# Patient Record
Sex: FEMALE | Race: WHITE | NOT HISPANIC OR LATINO | Employment: OTHER | URBAN - METROPOLITAN AREA
[De-identification: names, ages, dates, MRNs, and addresses within clinical notes are randomized per-mention and may not be internally consistent; named-entity substitution may affect disease eponyms.]

---

## 2017-05-21 ENCOUNTER — HISTORICAL (OUTPATIENT)
Dept: ADMINISTRATIVE | Facility: HOSPITAL | Age: 67
End: 2017-05-21

## 2017-05-21 LAB
BASOPHILS NFR BLD: 0 K/UL (ref 0–0.2)
BASOPHILS NFR BLD: 0.3 %
EOSINOPHIL NFR BLD: 0.2 K/UL (ref 0–0.7)
EOSINOPHIL NFR BLD: 1.4 %
ERYTHROCYTE [DISTWIDTH] IN BLOOD BY AUTOMATED COUNT: 23.2 % (ref 11.7–14.9)
GRAN #: 10.9 K/UL (ref 1.4–6.5)
GRAN%: 72.9 %
HCT VFR BLD AUTO: 26.5 % (ref 36–48)
HGB BLD-MCNC: 8.3 G/DL (ref 12–15)
IMMATURE GRANS (ABS): 0.1 K/UL (ref 0–1)
IMMATURE GRANULOCYTES: 0.7 %
LYMPH #: 2.1 K/UL (ref 1.2–3.4)
LYMPH%: 14.1 %
MCH RBC QN AUTO: 22.9 PG (ref 25–35)
MCHC RBC AUTO-ENTMCNC: 31.3 G/DL (ref 31–36)
MCV RBC AUTO: 73 FL (ref 79–98)
MONO #: 1.6 K/UL (ref 0.1–0.6)
MONO%: 10.6 %
NUCLEATED RBCS: 0 %
PLATELET # BLD AUTO: 236 K/UL (ref 140–440)
PMV BLD AUTO: 9.7 FL (ref 8.8–12.7)
RBC # BLD AUTO: 3.63 M/UL (ref 3.5–5.5)
WBC # BLD AUTO: 14.9 K/UL (ref 5–10)

## 2017-05-30 ENCOUNTER — HOSPITAL ENCOUNTER (INPATIENT)
Facility: HOSPITAL | Age: 67
LOS: 4 days | Discharge: HOME HEALTH | DRG: 418 | End: 2017-06-03
Attending: EMERGENCY MEDICINE | Admitting: INTERNAL MEDICINE
Payer: MEDICARE

## 2017-05-30 DIAGNOSIS — Z01.811 PRE-OP CHEST EXAM: ICD-10-CM

## 2017-05-30 DIAGNOSIS — J44.9 CHRONIC OBSTRUCTIVE PULMONARY DISEASE, UNSPECIFIED COPD TYPE: ICD-10-CM

## 2017-05-30 DIAGNOSIS — K85.90 ACUTE PANCREATITIS WITHOUT INFECTION OR NECROSIS, UNSPECIFIED PANCREATITIS TYPE: ICD-10-CM

## 2017-05-30 DIAGNOSIS — D75.839 THROMBOCYTOSIS: ICD-10-CM

## 2017-05-30 DIAGNOSIS — I48.91 ATRIAL FIBRILLATION, UNSPECIFIED TYPE: ICD-10-CM

## 2017-05-30 DIAGNOSIS — R93.89 ABNORMAL CT SCAN: ICD-10-CM

## 2017-05-30 DIAGNOSIS — K80.10 CALCULUS OF GALLBLADDER WITH CHRONIC CHOLECYSTITIS WITHOUT OBSTRUCTION: ICD-10-CM

## 2017-05-30 DIAGNOSIS — I50.32 CHRONIC DIASTOLIC CONGESTIVE HEART FAILURE: ICD-10-CM

## 2017-05-30 DIAGNOSIS — R10.10 UPPER ABDOMINAL PAIN: Primary | ICD-10-CM

## 2017-05-30 DIAGNOSIS — R07.9 CHEST PAIN: ICD-10-CM

## 2017-05-30 PROBLEM — G40.909 SEIZURE DISORDER: Status: ACTIVE | Noted: 2017-05-30

## 2017-05-30 LAB
ALBUMIN SERPL BCP-MCNC: 3.1 G/DL
ALP SERPL-CCNC: 98 U/L
ALT SERPL W/O P-5'-P-CCNC: 7 U/L
ANION GAP SERPL CALC-SCNC: 12 MMOL/L
ANISOCYTOSIS BLD QL SMEAR: ABNORMAL
AST SERPL-CCNC: 15 U/L
BASOPHILS # BLD AUTO: 0 K/UL
BASOPHILS NFR BLD: 0.1 %
BILIRUB SERPL-MCNC: 0.5 MG/DL
BUN SERPL-MCNC: 30 MG/DL
CALCIUM SERPL-MCNC: 9.8 MG/DL
CHLORIDE SERPL-SCNC: 106 MMOL/L
CO2 SERPL-SCNC: 23 MMOL/L
CREAT SERPL-MCNC: 1.6 MG/DL
DIFFERENTIAL METHOD: ABNORMAL
EOSINOPHIL # BLD AUTO: 0 K/UL
EOSINOPHIL NFR BLD: 0.1 %
ERYTHROCYTE [DISTWIDTH] IN BLOOD BY AUTOMATED COUNT: 24.4 %
EST. GFR  (AFRICAN AMERICAN): 38 ML/MIN/1.73 M^2
EST. GFR  (NON AFRICAN AMERICAN): 33 ML/MIN/1.73 M^2
GLUCOSE SERPL-MCNC: 125 MG/DL
HCT VFR BLD AUTO: 36.5 %
HGB BLD-MCNC: 11.6 G/DL
HYPOCHROMIA BLD QL SMEAR: ABNORMAL
LIPASE SERPL-CCNC: 32 U/L
LYMPHOCYTES # BLD AUTO: 1.7 K/UL
LYMPHOCYTES NFR BLD: 8.7 %
MCH RBC QN AUTO: 22.8 PG
MCHC RBC AUTO-ENTMCNC: 31.8 %
MCV RBC AUTO: 72 FL
MONOCYTES # BLD AUTO: 0.8 K/UL
MONOCYTES NFR BLD: 3.8 %
NEUTROPHILS # BLD AUTO: 17.4 K/UL
NEUTROPHILS NFR BLD: 87.3 %
OVALOCYTES BLD QL SMEAR: ABNORMAL
PLATELET # BLD AUTO: 617 K/UL
PMV BLD AUTO: 7.4 FL
POTASSIUM SERPL-SCNC: 4.3 MMOL/L
PROT SERPL-MCNC: 8.1 G/DL
RBC # BLD AUTO: 5.08 M/UL
SODIUM SERPL-SCNC: 141 MMOL/L
WBC # BLD AUTO: 20 K/UL

## 2017-05-30 PROCEDURE — 96361 HYDRATE IV INFUSION ADD-ON: CPT

## 2017-05-30 PROCEDURE — 25000003 PHARM REV CODE 250: Performed by: EMERGENCY MEDICINE

## 2017-05-30 PROCEDURE — 25000003 PHARM REV CODE 250: Performed by: PHYSICIAN ASSISTANT

## 2017-05-30 PROCEDURE — 80053 COMPREHEN METABOLIC PANEL: CPT

## 2017-05-30 PROCEDURE — 96376 TX/PRO/DX INJ SAME DRUG ADON: CPT

## 2017-05-30 PROCEDURE — 93005 ELECTROCARDIOGRAM TRACING: CPT

## 2017-05-30 PROCEDURE — 12000002 HC ACUTE/MED SURGE SEMI-PRIVATE ROOM

## 2017-05-30 PROCEDURE — 96375 TX/PRO/DX INJ NEW DRUG ADDON: CPT

## 2017-05-30 PROCEDURE — 96365 THER/PROPH/DIAG IV INF INIT: CPT

## 2017-05-30 PROCEDURE — 63600175 PHARM REV CODE 636 W HCPCS: Performed by: PHYSICIAN ASSISTANT

## 2017-05-30 PROCEDURE — 99285 EMERGENCY DEPT VISIT HI MDM: CPT | Mod: 25

## 2017-05-30 PROCEDURE — 36415 COLL VENOUS BLD VENIPUNCTURE: CPT

## 2017-05-30 PROCEDURE — 85025 COMPLETE CBC W/AUTO DIFF WBC: CPT

## 2017-05-30 PROCEDURE — 63600175 PHARM REV CODE 636 W HCPCS: Performed by: EMERGENCY MEDICINE

## 2017-05-30 PROCEDURE — 83690 ASSAY OF LIPASE: CPT

## 2017-05-30 RX ORDER — AMOXICILLIN 250 MG
1 CAPSULE ORAL 2 TIMES DAILY PRN
Status: DISCONTINUED | OUTPATIENT
Start: 2017-05-30 | End: 2017-06-03 | Stop reason: HOSPADM

## 2017-05-30 RX ORDER — LACOSAMIDE 10 MG/ML
150 SOLUTION ORAL 2 TIMES DAILY
Status: DISCONTINUED | OUTPATIENT
Start: 2017-05-30 | End: 2017-06-03 | Stop reason: HOSPADM

## 2017-05-30 RX ORDER — IPRATROPIUM BROMIDE AND ALBUTEROL SULFATE 2.5; .5 MG/3ML; MG/3ML
3 SOLUTION RESPIRATORY (INHALATION) EVERY 4 HOURS PRN
Status: DISCONTINUED | OUTPATIENT
Start: 2017-05-30 | End: 2017-06-03 | Stop reason: HOSPADM

## 2017-05-30 RX ORDER — ONDANSETRON 2 MG/ML
4 INJECTION INTRAMUSCULAR; INTRAVENOUS EVERY 6 HOURS PRN
Status: DISCONTINUED | OUTPATIENT
Start: 2017-05-30 | End: 2017-06-03 | Stop reason: HOSPADM

## 2017-05-30 RX ORDER — FUROSEMIDE 40 MG/1
40 TABLET ORAL 2 TIMES DAILY
COMMUNITY

## 2017-05-30 RX ORDER — PROCHLORPERAZINE EDISYLATE 5 MG/ML
5 INJECTION INTRAMUSCULAR; INTRAVENOUS EVERY 6 HOURS PRN
Status: DISCONTINUED | OUTPATIENT
Start: 2017-05-30 | End: 2017-06-03 | Stop reason: HOSPADM

## 2017-05-30 RX ORDER — ONDANSETRON 2 MG/ML
4 INJECTION INTRAMUSCULAR; INTRAVENOUS
Status: COMPLETED | OUTPATIENT
Start: 2017-05-30 | End: 2017-05-30

## 2017-05-30 RX ORDER — MORPHINE SULFATE 2 MG/ML
8 INJECTION, SOLUTION INTRAMUSCULAR; INTRAVENOUS
Status: COMPLETED | OUTPATIENT
Start: 2017-05-30 | End: 2017-05-30

## 2017-05-30 RX ORDER — MORPHINE SULFATE 2 MG/ML
6 INJECTION, SOLUTION INTRAMUSCULAR; INTRAVENOUS
Status: COMPLETED | OUTPATIENT
Start: 2017-05-30 | End: 2017-05-30

## 2017-05-30 RX ORDER — POTASSIUM CHLORIDE 20 MEQ/15ML
60 SOLUTION ORAL
Status: DISCONTINUED | OUTPATIENT
Start: 2017-05-30 | End: 2017-06-03 | Stop reason: HOSPADM

## 2017-05-30 RX ORDER — POTASSIUM CHLORIDE 20 MEQ/15ML
40 SOLUTION ORAL
Status: DISCONTINUED | OUTPATIENT
Start: 2017-05-30 | End: 2017-06-03 | Stop reason: HOSPADM

## 2017-05-30 RX ORDER — DIGOXIN 125 MCG
250 TABLET ORAL DAILY
Status: DISCONTINUED | OUTPATIENT
Start: 2017-05-31 | End: 2017-06-03 | Stop reason: HOSPADM

## 2017-05-30 RX ORDER — OXYCODONE HYDROCHLORIDE 5 MG/1
5 TABLET ORAL EVERY 4 HOURS PRN
Status: DISCONTINUED | OUTPATIENT
Start: 2017-05-30 | End: 2017-06-03 | Stop reason: HOSPADM

## 2017-05-30 RX ORDER — SUCRALFATE 1 G/1
1 TABLET ORAL 4 TIMES DAILY
COMMUNITY

## 2017-05-30 RX ORDER — SUCRALFATE 1 G/10ML
1 SUSPENSION ORAL
Status: DISCONTINUED | OUTPATIENT
Start: 2017-05-31 | End: 2017-06-03 | Stop reason: HOSPADM

## 2017-05-30 RX ORDER — POTASSIUM CHLORIDE 750 MG/1
10 TABLET, EXTENDED RELEASE ORAL DAILY
Status: DISCONTINUED | OUTPATIENT
Start: 2017-05-31 | End: 2017-05-30

## 2017-05-30 RX ORDER — LOSARTAN POTASSIUM 25 MG/1
50 TABLET ORAL 2 TIMES DAILY
Status: DISCONTINUED | OUTPATIENT
Start: 2017-05-30 | End: 2017-05-30

## 2017-05-30 RX ORDER — FLUOXETINE HYDROCHLORIDE 20 MG/1
20 CAPSULE ORAL DAILY
COMMUNITY

## 2017-05-30 RX ORDER — BACLOFEN 10 MG/1
10 TABLET ORAL 2 TIMES DAILY
Status: DISCONTINUED | OUTPATIENT
Start: 2017-05-30 | End: 2017-06-03 | Stop reason: HOSPADM

## 2017-05-30 RX ORDER — SODIUM CHLORIDE, SODIUM LACTATE, POTASSIUM CHLORIDE, CALCIUM CHLORIDE 600; 310; 30; 20 MG/100ML; MG/100ML; MG/100ML; MG/100ML
INJECTION, SOLUTION INTRAVENOUS CONTINUOUS
Status: DISCONTINUED | OUTPATIENT
Start: 2017-05-30 | End: 2017-06-03

## 2017-05-30 RX ORDER — MORPHINE SULFATE 4 MG/ML
4 INJECTION, SOLUTION INTRAMUSCULAR; INTRAVENOUS EVERY 4 HOURS PRN
Status: DISCONTINUED | OUTPATIENT
Start: 2017-05-30 | End: 2017-06-03

## 2017-05-30 RX ORDER — DEXTROSE MONOHYDRATE AND SODIUM CHLORIDE 5; .45 G/100ML; G/100ML
INJECTION, SOLUTION INTRAVENOUS CONTINUOUS
Status: DISCONTINUED | OUTPATIENT
Start: 2017-05-30 | End: 2017-05-30

## 2017-05-30 RX ORDER — LANOLIN ALCOHOL/MO/W.PET/CERES
800 CREAM (GRAM) TOPICAL
Status: DISCONTINUED | OUTPATIENT
Start: 2017-05-30 | End: 2017-06-03 | Stop reason: HOSPADM

## 2017-05-30 RX ORDER — HEPARIN SODIUM 5000 [USP'U]/ML
5000 INJECTION, SOLUTION INTRAVENOUS; SUBCUTANEOUS EVERY 12 HOURS
Status: DISCONTINUED | OUTPATIENT
Start: 2017-05-31 | End: 2017-06-03 | Stop reason: HOSPADM

## 2017-05-30 RX ORDER — TORSEMIDE 20 MG/1
20 TABLET ORAL DAILY
Status: DISCONTINUED | OUTPATIENT
Start: 2017-05-31 | End: 2017-05-30

## 2017-05-30 RX ORDER — ATORVASTATIN CALCIUM 40 MG/1
40 TABLET, FILM COATED ORAL NIGHTLY
Status: DISCONTINUED | OUTPATIENT
Start: 2017-05-30 | End: 2017-06-03 | Stop reason: HOSPADM

## 2017-05-30 RX ORDER — MORPHINE SULFATE 2 MG/ML
6 INJECTION, SOLUTION INTRAMUSCULAR; INTRAVENOUS EVERY 6 HOURS PRN
Status: DISCONTINUED | OUTPATIENT
Start: 2017-05-30 | End: 2017-05-30

## 2017-05-30 RX ORDER — ATORVASTATIN CALCIUM 40 MG/1
40 TABLET, FILM COATED ORAL DAILY
Status: DISCONTINUED | OUTPATIENT
Start: 2017-05-31 | End: 2017-05-30

## 2017-05-30 RX ORDER — METOPROLOL SUCCINATE 50 MG/1
200 TABLET, EXTENDED RELEASE ORAL DAILY
Status: DISCONTINUED | OUTPATIENT
Start: 2017-05-31 | End: 2017-06-03 | Stop reason: HOSPADM

## 2017-05-30 RX ORDER — ONDANSETRON 2 MG/ML
4 INJECTION INTRAMUSCULAR; INTRAVENOUS EVERY 8 HOURS PRN
Status: DISCONTINUED | OUTPATIENT
Start: 2017-05-30 | End: 2017-05-30

## 2017-05-30 RX ORDER — FLUOXETINE HYDROCHLORIDE 20 MG/1
20 CAPSULE ORAL DAILY
Status: DISCONTINUED | OUTPATIENT
Start: 2017-05-31 | End: 2017-06-03 | Stop reason: HOSPADM

## 2017-05-30 RX ORDER — ACETAMINOPHEN 500 MG
1000 TABLET ORAL EVERY 6 HOURS PRN
Status: DISCONTINUED | OUTPATIENT
Start: 2017-05-30 | End: 2017-06-03 | Stop reason: HOSPADM

## 2017-05-30 RX ORDER — ALPRAZOLAM 0.25 MG/1
0.25 TABLET ORAL 2 TIMES DAILY PRN
Status: DISCONTINUED | OUTPATIENT
Start: 2017-05-30 | End: 2017-05-30

## 2017-05-30 RX ORDER — PANTOPRAZOLE SODIUM 40 MG/1
40 TABLET, DELAYED RELEASE ORAL DAILY
Status: DISCONTINUED | OUTPATIENT
Start: 2017-05-31 | End: 2017-06-03 | Stop reason: HOSPADM

## 2017-05-30 RX ADMIN — DEXTROSE AND SODIUM CHLORIDE: 5; .45 INJECTION, SOLUTION INTRAVENOUS at 07:05

## 2017-05-30 RX ADMIN — SODIUM CHLORIDE, SODIUM LACTATE, POTASSIUM CHLORIDE, AND CALCIUM CHLORIDE: .6; .31; .03; .02 INJECTION, SOLUTION INTRAVENOUS at 10:05

## 2017-05-30 RX ADMIN — MORPHINE SULFATE 8 MG: 2 INJECTION, SOLUTION INTRAMUSCULAR; INTRAVENOUS at 02:05

## 2017-05-30 RX ADMIN — MORPHINE SULFATE 4 MG: 4 INJECTION INTRAVENOUS at 11:05

## 2017-05-30 RX ADMIN — PIPERACILLIN SODIUM AND TAZOBACTAM SODIUM 3.38 G: 3; .375 INJECTION, POWDER, FOR SOLUTION INTRAVENOUS at 05:05

## 2017-05-30 RX ADMIN — ATORVASTATIN CALCIUM 40 MG: 40 TABLET, FILM COATED ORAL at 11:05

## 2017-05-30 RX ADMIN — LACOSAMIDE 150 MG: 10 SOLUTION ORAL at 10:05

## 2017-05-30 RX ADMIN — SODIUM CHLORIDE 1000 ML: 0.9 INJECTION, SOLUTION INTRAVENOUS at 03:05

## 2017-05-30 RX ADMIN — LOSARTAN POTASSIUM 50 MG: 25 TABLET, FILM COATED ORAL at 09:05

## 2017-05-30 RX ADMIN — MORPHINE SULFATE 6 MG: 2 INJECTION, SOLUTION INTRAMUSCULAR; INTRAVENOUS at 05:05

## 2017-05-30 RX ADMIN — ONDANSETRON 4 MG: 2 INJECTION INTRAMUSCULAR; INTRAVENOUS at 03:05

## 2017-05-30 RX ADMIN — BACLOFEN 10 MG: 10 TABLET ORAL at 09:05

## 2017-05-30 NOTE — ED PROVIDER NOTES
"Encounter Date: 5/30/2017    SCRIBE #1 NOTE: I, Jacquie George, am scribing for, and in the presence of, Dr De Guzman.       History     Chief Complaint   Patient presents with    Abdominal Pain     Review of patient's allergies indicates:  No Known Allergies  05/30/2017  1:58 PM     Chief Complaint: Abdominal pain      Nelly Landa is a 66 y.o. female with a pmhx of A-fib; COPD; DM; MI; HTN; Stroke presenting [facility] for recurrent abdominal pain which has been ongoing for approximately two weeks. Pt was recently seen at [facility] where she was hospitalized for 11 days and was told she would need "her gall bladder taken out." She describes diffuse abdominal pain which varies in intensity but is constant. She has not noticed change in intensity of pain with eating and there are no alleviating factors. Pt also reports subjective fever and nausea. Denies emesis. Pt has a past surgical history that includes Leg amputation (Left, 2014); Hysterectomy; and Cardiac pacemaker placement.            Past Medical History:   Diagnosis Date    Atrial fibrillation     Cigarette smoker     COPD (chronic obstructive pulmonary disease)     Diabetes mellitus     Heart attack     Hypertension     Intractable epilepsy with complex partial seizures 11/13/2016    Stroke      Past Surgical History:   Procedure Laterality Date    CARDIAC PACEMAKER PLACEMENT      per patient 2014    HYSTERECTOMY      LEG AMPUTATION Left 2014    AKA     History reviewed. No pertinent family history.  Social History   Substance Use Topics    Smoking status: Current Every Day Smoker     Packs/day: 0.50     Years: 30.00    Smokeless tobacco: Never Used    Alcohol use No     Review of Systems   Constitutional: Positive for fever (subjective).   HENT: Negative for sore throat.    Eyes: Negative for visual disturbance.   Respiratory: Negative for cough.    Cardiovascular: Negative for chest pain.   Gastrointestinal: Positive for abdominal " pain and nausea. Negative for diarrhea and vomiting.   Genitourinary: Negative for difficulty urinating and pelvic pain.   Musculoskeletal: Negative for arthralgias.   Skin: Negative for rash.   Neurological: Negative for weakness.       Physical Exam     Initial Vitals   BP Pulse Resp Temp SpO2   -- -- -- -- --     Physical Exam    Nursing note and vitals reviewed.  Constitutional: She appears well-developed.   HENT:   Head: Normocephalic and atraumatic.   Mouth/Throat: Oropharynx is clear and moist.   Eyes: Conjunctivae are normal.   Neck: Neck supple.   Cardiovascular: Normal heart sounds and intact distal pulses. An irregularly irregular rhythm present. Tachycardia present.  Exam reveals no gallop and no friction rub.    No murmur heard.  Pulmonary/Chest: Breath sounds normal. She has no wheezes. She has no rhonchi. She has no rales.   Abdominal: Soft. She exhibits no distension. There is tenderness in the right lower quadrant, epigastric area and left lower quadrant. There is guarding (mild, voluntary).   No hernia. No flank tenderness.    Musculoskeletal:   Left AKA.    Neurological: She is alert and oriented to person, place, and time.   Skin: No rash noted. No erythema.   Psychiatric: She has a normal mood and affect.         ED Course   Procedures  Labs Reviewed   CBC W/ AUTO DIFFERENTIAL - Abnormal; Notable for the following:        Result Value    WBC 20.00 (*)     Hemoglobin 11.6 (*)     Hematocrit 36.5 (*)     MCV 72 (*)     MCH 22.8 (*)     MCHC 31.8 (*)     RDW 24.4 (*)     Platelets 617 (*)     MPV 7.4 (*)     Gran # 17.4 (*)     Gran% 87.3 (*)     Lymph% 8.7 (*)     Mono% 3.8 (*)     All other components within normal limits   COMPREHENSIVE METABOLIC PANEL - Abnormal; Notable for the following:     Glucose 125 (*)     BUN, Bld 30 (*)     Creatinine 1.6 (*)     Albumin 3.1 (*)     ALT 7 (*)     eGFR if  38 (*)     eGFR if non  33 (*)     All other components within  normal limits   LIPASE              Imaging Results          CT Abdomen Pelvis  Without Contrast (Final result)  Result time 05/30/17 16:31:48   Procedure changed from CT Abdomen Pelvis With Contrast     Final result by Mika Bautista MD (05/30/17 16:31:48)                 Impression:      1.  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  2.  Moderate dilatation of the common bile duct likely related to the pancreaticoduodenal process.  3.  Cholelithiasis.  4.  Left renal lesion probable cyst, however confirmation with renal ultrasound is recommended.      Electronically signed by: Mika Bautista MD  Date:     05/30/17  Time:    16:31              Narrative:    CT Abdomen and Pelvis without contrast    Comparison: 03/15/2016    Technique:  Helically acquired axial images of the abdomen and pelvis were acquired without contrast.   Reformatted coronal and sagittal images provided.    FINDINGS: There is moderate fat stranding about the head of the pancreas, within the pancreaticoduodenal groove, and about the descending limb of the duodenum which exhibits circumferential mural thickening.  No associated fluid collection.    There is a single coarse calcification within the mid body of the pancreas.  The pancreatic duct is nondilated.    The gallbladder is contracted.  It contains high density material which may reflect sludge, small stone measuring 2-3 mm is present at the neck of the gallbladder best seen on the coronal series.  No gallbladder wall thickening or pericholecystic fluid is evident.  There is dilatation of the common duct proximal to the above-described pancreatic or duodenal inflammatory process, to 15 mm diameter compared with 7 mm previously.    Cardiac pacer leads are partially seen.  There has been a prior mitral valve repair.  The liver, spleen, and adrenal glands are unremarkable.    The small bowel is normal in caliber.  The appendix is normal.  A few colonic  diverticula are present.  There has been a hysterectomy.  There is moderate calcification of aorta without aneurysm.    There are multiple bilateral nonobstructing renal stones measuring up to 6 mm.  There is a 2.2 cm lesion arising partially exophytically from the posterior lateral lower pole left kidney, demonstrating density above that to allow for characterization as a simple cyst.    There has been a right total hip arthroplasty.  The bones are osteopenic.                            The patient was informed of the incidental finding(s) as well as the need for PCP or specialist follow-up for reevaluation and possible further investigation or monitoring.             Scribe Attestation:   Scribe #1: I performed the above scribed service and the documentation accurately describes the services I performed. I attest to the accuracy of the note.    Attending Attestation:           Physician Attestation for Scribe:  Physician Attestation Statement for Scribe #1: I, Dr De Guzman, reviewed documentation, as scribed by Jacquie George in my presence, and it is both accurate and complete.         Nelly Landa is a 66 y.o. female presenting with continued upper abdominal pain and emesis.  Patient reports stay at outside hospital with concern for gallbladder pathology although uncertain.  Broad workup today shows inflammatory process adjacent to duodenum and pancreas.  There are gallstones and a contracted gallbladder but no other signs of cholecystitis.  I do not think she requires emergent cholecystectomy.  I did speak with Dr. Rodriguez from the hospitalist service who advises admission.  Patient's lipase is normal and pancreatitis is less likely.  I favor possible duodenal pathology.  No sign of other process such as perforation, gangrenous cholecystitis, obstruction, abscess.  I doubt cardiac process.  I do not think further cardiac biomarker is indicated.  Increased creatinine with renal insufficiency likely secondary  dehydration with IV fluids and antiemetic given the emergency department along with analgesia.          ED Course   Comment By Time   EKG:  Atrial fibrillation, rate of 86.  Normal intervals except mild prolonged QTc at 471 ms.  Other intervals normal.  Normal axis.  Old inferior and lateral ST flattening compared to prior. There are no acute ST or T wave changes suggestive of acute ischemia or infarction. Malvin De Guzman MD 05/30 [id number]     Clinical Impression:   The encounter diagnosis was Upper abdominal pain.          Malvin De Guzman MD  05/30/17 [id number]

## 2017-05-31 PROBLEM — R10.10 UPPER ABDOMINAL PAIN: Status: ACTIVE | Noted: 2017-05-31

## 2017-05-31 PROBLEM — K80.10 CALCULUS OF GALLBLADDER WITH CHRONIC CHOLECYSTITIS WITHOUT OBSTRUCTION: Status: ACTIVE | Noted: 2017-05-31

## 2017-05-31 PROBLEM — R93.89 ABNORMAL CT SCAN: Status: ACTIVE | Noted: 2017-05-31

## 2017-05-31 PROBLEM — J44.9 COPD (CHRONIC OBSTRUCTIVE PULMONARY DISEASE): Status: ACTIVE | Noted: 2017-05-31

## 2017-05-31 LAB
ALBUMIN SERPL BCP-MCNC: 2.5 G/DL
ALP SERPL-CCNC: 206 U/L
ALT SERPL W/O P-5'-P-CCNC: 27 U/L
ANION GAP SERPL CALC-SCNC: 9 MMOL/L
AST SERPL-CCNC: 51 U/L
BASOPHILS # BLD AUTO: 0.1 K/UL
BASOPHILS NFR BLD: 0.4 %
BILIRUB SERPL-MCNC: 0.5 MG/DL
BUN SERPL-MCNC: 21 MG/DL
CALCIUM SERPL-MCNC: 8.8 MG/DL
CHLORIDE SERPL-SCNC: 109 MMOL/L
CO2 SERPL-SCNC: 20 MMOL/L
CREAT SERPL-MCNC: 1.2 MG/DL
DIFFERENTIAL METHOD: ABNORMAL
EOSINOPHIL # BLD AUTO: 0.1 K/UL
EOSINOPHIL NFR BLD: 0.8 %
ERYTHROCYTE [DISTWIDTH] IN BLOOD BY AUTOMATED COUNT: 23.9 %
EST. GFR  (AFRICAN AMERICAN): 54 ML/MIN/1.73 M^2
EST. GFR  (NON AFRICAN AMERICAN): 47 ML/MIN/1.73 M^2
GLUCOSE SERPL-MCNC: 89 MG/DL
HCT VFR BLD AUTO: 31.9 %
HGB BLD-MCNC: 10.1 G/DL
LYMPHOCYTES # BLD AUTO: 2 K/UL
LYMPHOCYTES NFR BLD: 12 %
MAGNESIUM SERPL-MCNC: 1.6 MG/DL
MCH RBC QN AUTO: 22.8 PG
MCHC RBC AUTO-ENTMCNC: 31.8 %
MCV RBC AUTO: 72 FL
MONOCYTES # BLD AUTO: 1.7 K/UL
MONOCYTES NFR BLD: 10.1 %
NEUTROPHILS # BLD AUTO: 12.8 K/UL
NEUTROPHILS NFR BLD: 76.7 %
PHOSPHATE SERPL-MCNC: 4.1 MG/DL
PLATELET # BLD AUTO: 462 K/UL
PMV BLD AUTO: 7.7 FL
POTASSIUM SERPL-SCNC: 4.3 MMOL/L
PROT SERPL-MCNC: 6.5 G/DL
RBC # BLD AUTO: 4.45 M/UL
SODIUM SERPL-SCNC: 138 MMOL/L
WBC # BLD AUTO: 16.7 K/UL

## 2017-05-31 PROCEDURE — 85025 COMPLETE CBC W/AUTO DIFF WBC: CPT

## 2017-05-31 PROCEDURE — 99223 1ST HOSP IP/OBS HIGH 75: CPT | Mod: ,,, | Performed by: SURGERY

## 2017-05-31 PROCEDURE — 63600175 PHARM REV CODE 636 W HCPCS: Performed by: PHYSICIAN ASSISTANT

## 2017-05-31 PROCEDURE — 99900035 HC TECH TIME PER 15 MIN (STAT)

## 2017-05-31 PROCEDURE — 80053 COMPREHEN METABOLIC PANEL: CPT

## 2017-05-31 PROCEDURE — 25000003 PHARM REV CODE 250: Performed by: EMERGENCY MEDICINE

## 2017-05-31 PROCEDURE — 25000003 PHARM REV CODE 250: Performed by: PHYSICIAN ASSISTANT

## 2017-05-31 PROCEDURE — 99223 1ST HOSP IP/OBS HIGH 75: CPT | Mod: ,,, | Performed by: INTERNAL MEDICINE

## 2017-05-31 PROCEDURE — 12000002 HC ACUTE/MED SURGE SEMI-PRIVATE ROOM

## 2017-05-31 PROCEDURE — 99222 1ST HOSP IP/OBS MODERATE 55: CPT | Mod: ,,, | Performed by: INTERNAL MEDICINE

## 2017-05-31 PROCEDURE — 83735 ASSAY OF MAGNESIUM: CPT

## 2017-05-31 PROCEDURE — 84100 ASSAY OF PHOSPHORUS: CPT

## 2017-05-31 PROCEDURE — 36415 COLL VENOUS BLD VENIPUNCTURE: CPT

## 2017-05-31 RX ORDER — IBUPROFEN 200 MG
1 TABLET ORAL DAILY
Status: DISCONTINUED | OUTPATIENT
Start: 2017-05-31 | End: 2017-06-03 | Stop reason: HOSPADM

## 2017-05-31 RX ADMIN — MORPHINE SULFATE 4 MG: 4 INJECTION INTRAVENOUS at 08:05

## 2017-05-31 RX ADMIN — DIGOXIN 250 MCG: 0.12 TABLET ORAL at 09:05

## 2017-05-31 RX ADMIN — MORPHINE SULFATE 4 MG: 4 INJECTION INTRAVENOUS at 03:05

## 2017-05-31 RX ADMIN — LACOSAMIDE 150 MG: 10 SOLUTION ORAL at 09:05

## 2017-05-31 RX ADMIN — NICOTINE 1 PATCH: 21 PATCH, EXTENDED RELEASE TRANSDERMAL at 09:05

## 2017-05-31 RX ADMIN — LACOSAMIDE 150 MG: 10 SOLUTION ORAL at 12:05

## 2017-05-31 RX ADMIN — SUCRALFATE 1 G: 1 SUSPENSION ORAL at 05:05

## 2017-05-31 RX ADMIN — SODIUM CHLORIDE, SODIUM LACTATE, POTASSIUM CHLORIDE, AND CALCIUM CHLORIDE: .6; .31; .03; .02 INJECTION, SOLUTION INTRAVENOUS at 05:05

## 2017-05-31 RX ADMIN — FLUOXETINE 20 MG: 20 CAPSULE ORAL at 09:05

## 2017-05-31 RX ADMIN — MORPHINE SULFATE 4 MG: 4 INJECTION INTRAVENOUS at 12:05

## 2017-05-31 RX ADMIN — BACLOFEN 10 MG: 10 TABLET ORAL at 08:05

## 2017-05-31 RX ADMIN — ATORVASTATIN CALCIUM 40 MG: 40 TABLET, FILM COATED ORAL at 08:05

## 2017-05-31 RX ADMIN — HEPARIN SODIUM 5000 UNITS: 5000 INJECTION, SOLUTION INTRAVENOUS; SUBCUTANEOUS at 09:05

## 2017-05-31 RX ADMIN — MORPHINE SULFATE 4 MG: 4 INJECTION INTRAVENOUS at 04:05

## 2017-05-31 RX ADMIN — PANTOPRAZOLE SODIUM 40 MG: 40 TABLET, DELAYED RELEASE ORAL at 09:05

## 2017-05-31 RX ADMIN — SUCRALFATE 1 G: 1 SUSPENSION ORAL at 04:05

## 2017-05-31 RX ADMIN — HEPARIN SODIUM 5000 UNITS: 5000 INJECTION, SOLUTION INTRAVENOUS; SUBCUTANEOUS at 08:05

## 2017-05-31 RX ADMIN — BACLOFEN 10 MG: 10 TABLET ORAL at 09:05

## 2017-05-31 RX ADMIN — METOPROLOL SUCCINATE 200 MG: 50 TABLET, EXTENDED RELEASE ORAL at 09:05

## 2017-05-31 NOTE — SUBJECTIVE & OBJECTIVE
No current facility-administered medications on file prior to encounter.      Current Outpatient Prescriptions on File Prior to Encounter   Medication Sig    atorvastatin (LIPITOR) 40 MG tablet Take 40 mg by mouth once daily.     baclofen (LIORESAL) 10 MG tablet Take 10 mg by mouth 2 (two) times daily.    lacosamide 150 mg Tab Take 1 tablet (150 mg total) by mouth 2 (two) times daily.    metoprolol succinate (TOPROL-XL) 200 MG 24 hr tablet Take 1 tablet (200 mg total) by mouth once daily.    pantoprazole (PROTONIX) 40 MG tablet Take 40 mg by mouth once daily.    rivaroxaban (XARELTO) 20 mg Tab Take 20 mg by mouth daily with dinner or evening meal.    torsemide (DEMADEX) 20 MG Tab Take 1 tablet (20 mg total) by mouth once daily.       Review of patient's allergies indicates:  No Known Allergies    Past Medical History:   Diagnosis Date    Atrial fibrillation     Cigarette smoker     COPD (chronic obstructive pulmonary disease)     Diabetes mellitus     Heart attack     Hypertension     Intractable epilepsy with complex partial seizures 11/13/2016    Stroke      Past Surgical History:   Procedure Laterality Date    CARDIAC PACEMAKER PLACEMENT      per patient 2014    HYSTERECTOMY      LEG AMPUTATION Left 2014    AKA     Family History     Problem Relation (Age of Onset)    Cancer Mother    No Known Problems Father        Social History Main Topics    Smoking status: Current Every Day Smoker     Packs/day: 1.00     Years: 30.00    Smokeless tobacco: Never Used    Alcohol use No    Drug use: No    Sexual activity: Not Currently     Review of Systems   Constitutional: Negative for appetite change, chills, diaphoresis, fatigue, fever and unexpected weight change.   HENT: Negative for hearing loss, sore throat, trouble swallowing and voice change.    Eyes: Negative for visual disturbance.   Respiratory: Negative for cough, shortness of breath and wheezing.    Cardiovascular: Negative for chest pain,  palpitations and leg swelling.   Gastrointestinal: Positive for abdominal pain and nausea. Negative for abdominal distention, anal bleeding, blood in stool, constipation, diarrhea, rectal pain and vomiting.   Genitourinary: Negative for difficulty urinating, dysuria, flank pain, frequency, hematuria, menstrual problem and urgency.   Musculoskeletal: Negative for arthralgias, back pain, joint swelling, myalgias and neck pain.   Skin: Negative for pallor and rash.   Neurological: Negative for dizziness, syncope, weakness and headaches.   Hematological: Negative for adenopathy. Does not bruise/bleed easily.   Psychiatric/Behavioral: Negative for suicidal ideas. The patient is not nervous/anxious.      Objective:     Vital Signs (Most Recent):  Temp: 98.1 °F (36.7 °C) (05/31/17 0700)  Pulse: 95 (05/31/17 0700)  Resp: 18 (05/31/17 0700)  BP: (!) 178/71 (05/31/17 0700)  SpO2: 95 % (05/31/17 0700) Vital Signs (24h Range):  Temp:  [97.7 °F (36.5 °C)-98.1 °F (36.7 °C)] 98.1 °F (36.7 °C)  Pulse:  [76-95] 95  Resp:  [18] 18  SpO2:  [93 %-100 %] 95 %  BP: (140-178)/(71-80) 178/71     Weight: 70.3 kg (155 lb)  Body mass index is 30.27 kg/m².    Physical Exam   Constitutional: She is oriented to person, place, and time. She appears well-developed and well-nourished. No distress.   HENT:   Head: Normocephalic and atraumatic.   Right Ear: External ear normal.   Left Ear: External ear normal.   Eyes: Conjunctivae are normal. Pupils are equal, round, and reactive to light. Right eye exhibits no discharge. Left eye exhibits no discharge.   Neck: No tracheal deviation present. No thyromegaly present.   Cardiovascular: Normal rate and regular rhythm.    Pulmonary/Chest: Effort normal. No respiratory distress.   Abdominal: Soft. She exhibits no distension. There is no hepatosplenomegaly. There is tenderness in the right lower quadrant and periumbilical area. There is no rebound and no guarding. No hernia.   Musculoskeletal: She exhibits  no edema or tenderness.   Lymphadenopathy:     She has no cervical adenopathy.   Neurological: She is alert and oriented to person, place, and time. No cranial nerve deficit.   Skin: Skin is warm and dry. No rash noted. She is not diaphoretic. No pallor.   Psychiatric: She has a normal mood and affect. Her behavior is normal. Judgment and thought content normal.       Significant Labs:  CBC:   Recent Labs  Lab 05/31/17  0635   WBC 16.70*   RBC 4.45   HGB 10.1*   HCT 31.9*   *   MCV 72*   MCH 22.8*   MCHC 31.8*     CMP:   Recent Labs  Lab 05/31/17  0635   GLU 89   CALCIUM 8.8   ALBUMIN 2.5*   PROT 6.5      K 4.3   CO2 20*      BUN 21   CREATININE 1.2   ALKPHOS 206*   ALT 27   AST 51*   BILITOT 0.5     Coagulation: No results for input(s): INR, APTT in the last 168 hours.    Invalid input(s): PT    Significant Diagnostics:  I have reviewed all pertinent imaging results/findings within the past 24 hours.

## 2017-05-31 NOTE — CONSULTS
[facility]     CC: Epigastric pain    HPI 66 y.o. female with epigastric pain, onset a couple of weeks ago, epigastric location, sharp/burning/aching, nonradiating, associated with emesis which is nonbloody, with no alleviating/exacerbating factors.  She was admitted at [facility] for the same a couple of weeks ago and had extensive workup.  All these records were independently reviewed including endoscopy notes from Dr. Vargas.  She had inflammation of the antrum and proximal duodenum with shallow ulcerations.  She is on PPI.  She had imaging with periduodenal/peripancreatic inflammation noted as well as CBD dilatation.  She has normal LFTs with no elevation of lipase, bilirubin, or alk phos.  She was told that she might need cholecystectomy, but surgery opted to discharge her on antibiotics for the time being.  She then presented to our hospital for persistent symptoms.  No other new complaints at this time.  Case was discussed with Dr. Marquez in person.    Past Medical History:   Diagnosis Date    Atrial fibrillation     Cigarette smoker     COPD (chronic obstructive pulmonary disease)     Diabetes mellitus     Heart attack     Hypertension     Intractable epilepsy with complex partial seizures 11/13/2016    Stroke        Past Surgical History:   Procedure Laterality Date    CARDIAC PACEMAKER PLACEMENT      per patient 2014    HYSTERECTOMY      LEG AMPUTATION Left 2014    AKA       Social History   Substance Use Topics    Smoking status: Current Every Day Smoker     Packs/day: 1.00     Years: 30.00    Smokeless tobacco: Never Used    Alcohol use No       Family History   Problem Relation Age of Onset    Cancer Mother     No Known Problems Father        Review of Systems  General ROS: negative for - chills, fever or weight loss  Psychological ROS: negative for - hallucination, depression or suicidal ideation  Ophthalmic ROS: negative for - blurry vision, photophobia or eye pain  ENT ROS:  negative for - epistaxis, sore throat or rhinorrhea  Respiratory ROS: no cough, shortness of breath, or wheezing  Cardiovascular ROS: no chest pain or dyspnea on exertion  Gastrointestinal ROS: + abdominal pain and emesis  Genito-Urinary ROS: no dysuria, trouble voiding, or hematuria  Musculoskeletal ROS: negative for - arthralgia, myalgia, weakness, + history of left AKA  Neurological ROS: no syncope or seizures; no ataxia  Dermatological ROS: negative for pruritis, rash and jaundice    Physical Examination  BP (!) 170/85 (BP Location: Left arm, Patient Position: Lying, BP Method: Automatic)   Pulse 69   Temp 98.2 °F (36.8 °C) (Oral)   Resp 20   Ht 5' (1.524 m)   Wt 70.3 kg (155 lb)   LMP  (LMP Unknown)   SpO2 96%   Breastfeeding? No   BMI 30.27 kg/m²   General appearance: alert, cooperative, no distress  HENT: Normocephalic, atraumatic, neck symmetrical, no nasal discharge   Eyes: conjunctivae/corneas clear, PERRL, EOM's intact, sclera anicteric  Lungs: clear to auscultation bilaterally, no dullness to percussion bilaterally, symmetric expansion, breathing unlabored  Heart: regular rate and rhythm without rub; no displacement of the PMI   Abdomen: + epigastric tenderness, no rebound, + BS  Extremities: extremities symmetric except for left AKA; no clubbing, cyanosis, or edema  Integument: Skin color, texture, turgor normal; no rashes; hair distrubution normal, no jaundice  Neurologic: Alert and oriented X 3, no focal sensory or motor neurologic deficits  Psychiatric: no pressured speech; normal affect; no evidence of impaired cognition, no anxiety/depression     Labs:  Lab Results   Component Value Date    WBC 16.70 (H) 05/31/2017    HGB 10.1 (L) 05/31/2017    HCT 31.9 (L) 05/31/2017    MCV 72 (L) 05/31/2017     (H) 05/31/2017       CMP  Sodium   Date Value Ref Range Status   05/31/2017 138 136 - 145 mmol/L Final     Potassium   Date Value Ref Range Status   05/31/2017 4.3 3.5 - 5.1 mmol/L Final      Chloride   Date Value Ref Range Status   05/31/2017 109 95 - 110 mmol/L Final     CO2   Date Value Ref Range Status   05/31/2017 20 (L) 23 - 29 mmol/L Final     Glucose   Date Value Ref Range Status   05/31/2017 89 70 - 110 mg/dL Final     BUN, Bld   Date Value Ref Range Status   05/31/2017 21 8 - 23 mg/dL Final     Creatinine   Date Value Ref Range Status   05/31/2017 1.2 0.5 - 1.4 mg/dL Final     Calcium   Date Value Ref Range Status   05/31/2017 8.8 8.7 - 10.5 mg/dL Final     Total Protein   Date Value Ref Range Status   05/31/2017 6.5 6.0 - 8.4 g/dL Final     Albumin   Date Value Ref Range Status   05/31/2017 2.5 (L) 3.5 - 5.2 g/dL Final     Total Bilirubin   Date Value Ref Range Status   05/31/2017 0.5 0.1 - 1.0 mg/dL Final     Comment:     For infants and newborns, interpretation of results should be based  on gestational age, weight and in agreement with clinical  observations.  Premature Infant recommended reference ranges:  Up to 24 hours.............<8.0 mg/dL  Up to 48 hours............<12.0 mg/dL  3-5 days..................<15.0 mg/dL  6-29 days.................<15.0 mg/dL       Alkaline Phosphatase   Date Value Ref Range Status   05/31/2017 206 (H) 55 - 135 U/L Final     AST   Date Value Ref Range Status   05/31/2017 51 (H) 10 - 40 U/L Final     ALT   Date Value Ref Range Status   05/31/2017 27 10 - 44 U/L Final     Anion Gap   Date Value Ref Range Status   05/31/2017 9 8 - 16 mmol/L Final     eGFR if    Date Value Ref Range Status   05/31/2017 54 (A) >60 mL/min/1.73 m^2 Final     eGFR if non    Date Value Ref Range Status   05/31/2017 47 (A) >60 mL/min/1.73 m^2 Final     Comment:     Calculation used to obtain the estimated glomerular filtration  rate (eGFR) is the CKD-EPI equation. Since race is unknown   in our information system, the eGFR values for   -American and Non--American patients are given   for each creatinine result.              Imaging:  Multiple imaging modalities were independently visualized and reviewed by me and showed possible groove pancreatitis/duodenal thickening, and dilated CBD without filling defect.  The patient cannot have MRCP secondary to pacemaker.    I have personally reviewed these images    Assessment:   1.  CBD dilatation  2.  Epigastric pain  3.  Abnormal imaging - likely secondary to acute ulcerative duodenitis which was evaluated by EGD on 5/17 per Dr. Vargas  4.  Multiple medical problems  5.  Likely cholecystitis      Plan:  1.  PPI  2.  Avoid NSAIDs  3.  With normal LFTs and no filling defect noted, ERCP not indicated.  Since patient cannot have MRCP because of pacemaker, EUS to further evaluate the CBD is recommended  4.  Surgery for cholecystectomy  5.  Repeat EGD in 6-8 weeks to check on healing of ulcers  6.  Further recommendations to follow after above.  7.  Communication will be sent to the referring MD, Dr. Rodriguez regarding my assessment and plan on this patient via [employer].  GI to sign off for now.  Call for questions.      Danny Chase MD  [facility]  [address], [address]  [place] [address]  Office: [phone]  Fax: [phone]

## 2017-05-31 NOTE — CONSULTS
"[facility]  General Surgery  Consult Note    Patient Name: Nelly Landa  MRN: [id number]  Code Status: Prior  Admission Date: 5/30/2017  Hospital Length of Stay: 1 days  Attending Physician: Shirlene Rodriguez MD  Primary Care Provider: Kyle Acuna MD    Patient information was obtained from patient and ER records.     Inpatient consult to General Surgery  Consult performed by: DEMETRIS MORGAN  Consult ordered by: FAUSTINO FUNEZ        Subjective:     Principal Problem: Pancreatitis, acute    History of Present Illness: Miss Landa presents for evaluation of abdominal pain which has been present for 1-2 weeks. The pain is worse after she eats. The pain is throughout her abdomen. She denies known alleviating factors other than analgesics she has received here. She reports being evaluated at [facility] for 11 days and being told she would need a cholecystectomy.  When asked why they didn't do it at [facility] she said the surgeon wasn't sure her pain was coming from the gallbladder.  She describes diffuse abdominal pain which varies in intensity but is constant. She has not noticed change in intensity of pain with eating and there are no alleviating factors. Pt also reports subjective fever and nausea. Denies emesis.   She was evaluated here in the ED and underwent CT abdomen which revealed findings suggestive of "groove pancreatitis"  and dilated CBD, possibly due to pancreaticoduodenal process. She denies alcohol consumption, trauma, new medication. She smokes cigarettes. She denies weight loss. She states her mother was diagnosed with either biliary or liver cancer. She reports history of PUD. She denies using NSAID recently. She denies fever.    No current facility-administered medications on file prior to encounter.      Current Outpatient Prescriptions on File Prior to Encounter   Medication Sig    atorvastatin (LIPITOR) 40 MG tablet Take 40 mg by mouth once daily.     baclofen (LIORESAL) 10 MG tablet Take 10 " mg by mouth 2 (two) times daily.    lacosamide 150 mg Tab Take 1 tablet (150 mg total) by mouth 2 (two) times daily.    metoprolol succinate (TOPROL-XL) 200 MG 24 hr tablet Take 1 tablet (200 mg total) by mouth once daily.    pantoprazole (PROTONIX) 40 MG tablet Take 40 mg by mouth once daily.    rivaroxaban (XARELTO) 20 mg Tab Take 20 mg by mouth daily with dinner or evening meal.    torsemide (DEMADEX) 20 MG Tab Take 1 tablet (20 mg total) by mouth once daily.       Review of patient's allergies indicates:  No Known Allergies    Past Medical History:   Diagnosis Date    Atrial fibrillation     Cigarette smoker     COPD (chronic obstructive pulmonary disease)     Diabetes mellitus     Heart attack     Hypertension     Intractable epilepsy with complex partial seizures 11/13/2016    Stroke      Past Surgical History:   Procedure Laterality Date    CARDIAC PACEMAKER PLACEMENT      per patient 2014    HYSTERECTOMY      LEG AMPUTATION Left 2014    AKA     Family History     Problem Relation (Age of Onset)    Cancer Mother    No Known Problems Father        Social History Main Topics    Smoking status: Current Every Day Smoker     Packs/day: 1.00     Years: 30.00    Smokeless tobacco: Never Used    Alcohol use No    Drug use: No    Sexual activity: Not Currently     Review of Systems   Constitutional: Negative for appetite change, chills, diaphoresis, fatigue, fever and unexpected weight change.   HENT: Negative for hearing loss, sore throat, trouble swallowing and voice change.    Eyes: Negative for visual disturbance.   Respiratory: Negative for cough, shortness of breath and wheezing.    Cardiovascular: Negative for chest pain, palpitations and leg swelling.   Gastrointestinal: Positive for abdominal pain and nausea. Negative for abdominal distention, anal bleeding, blood in stool, constipation, diarrhea, rectal pain and vomiting.   Genitourinary: Negative for difficulty urinating, dysuria, flank  pain, frequency, hematuria, menstrual problem and urgency.   Musculoskeletal: Negative for arthralgias, back pain, joint swelling, myalgias and neck pain.   Skin: Negative for pallor and rash.   Neurological: Negative for dizziness, syncope, weakness and headaches.   Hematological: Negative for adenopathy. Does not bruise/bleed easily.   Psychiatric/Behavioral: Negative for suicidal ideas. The patient is not nervous/anxious.      Objective:     Vital Signs (Most Recent):  Temp: 98.1 °F (36.7 °C) (05/31/17 0700)  Pulse: 95 (05/31/17 0700)  Resp: 18 (05/31/17 0700)  BP: (!) 178/71 (05/31/17 0700)  SpO2: 95 % (05/31/17 0700) Vital Signs (24h Range):  Temp:  [97.7 °F (36.5 °C)-98.1 °F (36.7 °C)] 98.1 °F (36.7 °C)  Pulse:  [76-95] 95  Resp:  [18] 18  SpO2:  [93 %-100 %] 95 %  BP: (140-178)/(71-80) 178/71     Weight: 70.3 kg (155 lb)  Body mass index is 30.27 kg/m².    Physical Exam   Constitutional: She is oriented to person, place, and time. She appears well-developed and well-nourished. No distress.   HENT:   Head: Normocephalic and atraumatic.   Right Ear: External ear normal.   Left Ear: External ear normal.   Eyes: Conjunctivae are normal. Pupils are equal, round, and reactive to light. Right eye exhibits no discharge. Left eye exhibits no discharge.   Neck: No tracheal deviation present. No thyromegaly present.   Cardiovascular: Normal rate and regular rhythm.    Pulmonary/Chest: Effort normal. No respiratory distress.   Abdominal: Soft. She exhibits no distension. There is no hepatosplenomegaly. There is tenderness in the right lower quadrant and periumbilical area. There is no rebound and no guarding. No hernia.   Musculoskeletal: She exhibits no edema or tenderness.   Lymphadenopathy:     She has no cervical adenopathy.   Neurological: She is alert and oriented to person, place, and time. No cranial nerve deficit.   Skin: Skin is warm and dry. No rash noted. She is not diaphoretic. No pallor.   Psychiatric: She  has a normal mood and affect. Her behavior is normal. Judgment and thought content normal.       Significant Labs:  CBC:   Recent Labs  Lab 05/31/17  0635   WBC 16.70*   RBC 4.45   HGB 10.1*   HCT 31.9*   *   MCV 72*   MCH 22.8*   MCHC 31.8*     CMP:   Recent Labs  Lab 05/31/17  0635   GLU 89   CALCIUM 8.8   ALBUMIN 2.5*   PROT 6.5      K 4.3   CO2 20*      BUN 21   CREATININE 1.2   ALKPHOS 206*   ALT 27   AST 51*   BILITOT 0.5     Coagulation: No results for input(s): INR, APTT in the last 168 hours.    Invalid input(s): PT    Significant Diagnostics:  I have reviewed all pertinent imaging results/findings within the past 24 hours.    Assessment/Plan:     * Pancreatitis, acute    At this time, lipase is normal.  Awaiting records from [facility] and GI consult.  She states she had an EGD at [facility] that showed bleeding ulcers.  Recommend elective cholecystectomy when cleared by GI.          VTE Risk Mitigation         Ordered     heparin (porcine) injection 5,000 Units  Every 12 hours     Route:  Subcutaneous        05/30/17 2246     Medium Risk of VTE  Once      05/30/17 2246          Thank you for your consult. I will follow-up with patient. Please contact us if you have any additional questions.    Syed Marquez MD  General Surgery  [facility]

## 2017-05-31 NOTE — PLAN OF CARE
SW met w/ pt to complete d/c assessment.  Pt lives w/ dtr, her adult children provide daily support and assistance.  Pt currently receives HH w/ [facility], requests to resume w/ same provider.  Pt has DME to assist w/ L leg amputation.  Pt states her son Mars Ellison has POA, not sure if she has this documented.  Pt uses [facility].     05/31/17 2887   Discharge Assessment   Assessment Type Discharge Planning Assessment   Confirmed/corrected address and phone number on facesheet? Yes   Assessment information obtained from? Patient   Type of Healthcare Directive Received Durable power of  for health care;Living will  (not on file  Pt provided verbal that son Mars Ellison is pt's healthcare POA)   Prior to hospitilization cognitive status: Alert/Oriented   Prior to hospitalization functional status: Needs Assistance;Wheelchair Bound   Current cognitive status: Alert/Oriented   Current Functional Status: Needs Assistance;Wheelchair Bound   Arrived From home health   Lives With child(josue), adult   Able to Return to Prior Arrangements yes   Is patient able to care for self after discharge? Yes   How many people do you have in your home that can help with your care after discharge? 1   Who are your caregiver(s) and their phone number(s)? dtr:  Roselia Ellison [phone]   Patient's perception of discharge disposition home health   Readmission Within The Last 30 Days no previous admission in last 30 days   Patient currently being followed by outpatient case management? No   Patient currently receives home health services? Yes   Patient previously received home health services and would like to resume services if necessary? Yes   If yes, name of home health provider: [facility]   Does the patient currently use HME? Yes   Patient currently receives private duty nursing? No   Patient currently receives any other outside agency services? No   Equipment Currently Used at Home hospital bed;wheelchair;bedside  commode;prosthesis;walker, rolling  (pt has prosthetic for L leg, states she does not use it)   Do you have any problems affording any of your prescribed medications? No   Is the patient taking medications as prescribed? yes   Do you have any financial concerns preventing you from receiving the healthcare you need? No   Does the patient have transportation to healthcare appointments? Yes   Transportation Available family or friend will provide   On Dialysis? No   Does the patient receive services at the [facility]? No   Are there any open cases? No   Discharge Plan A Home Health   Discharge Plan B Home Health   Patient/Family In Agreement With Plan yes

## 2017-05-31 NOTE — ASSESSMENT & PLAN NOTE
At this time, lipase is normal.  Awaiting records from [facility] and GI consult.  She states she had an EGD at [facility] that showed bleeding ulcers.  Recommend elective cholecystectomy when cleared by GI.

## 2017-05-31 NOTE — HPI
"Miss Landa presents for evaluation of abdominal pain which has been present for 1-2 weeks. The pain is worse after she eats. The pain is throughout her abdomen. She denies known alleviating factors other than analgesics she has received here. She reports being evaluated at [facility] for 11 days and being told she would need a cholecystectomy.  When asked why they didn't do it at [facility] she said the surgeon wasn't sure her pain was coming from the gallbladder.  She describes diffuse abdominal pain which varies in intensity but is constant. She has not noticed change in intensity of pain with eating and there are no alleviating factors. Pt also reports subjective fever and nausea. Denies emesis.   She was evaluated here in the ED and underwent CT abdomen which revealed findings suggestive of "groove pancreatitis"  and dilated CBD, possibly due to pancreaticoduodenal process. She denies alcohol consumption, trauma, new medication. She smokes cigarettes. She denies weight loss. She states her mother was diagnosed with either biliary or liver cancer. She reports history of PUD. She denies using NSAID recently. She denies fever.  "

## 2017-05-31 NOTE — ASSESSMENT & PLAN NOTE
NPO  IVF  PRN analgesics  Consult GE for possible EGD to evaluate duodenum  Consult surgeon for possible cholecystectomy

## 2017-05-31 NOTE — PLAN OF CARE
Problem: Patient Care Overview  Goal: Plan of Care Review  Outcome: Ongoing (interventions implemented as appropriate)  Pt remains free from injury or falls. Vital signs stable throughout night on room air. Incontinence care provided, Telemetry maintained. Pt NPO. Positions self independently in bed,  Left AKA.  Pain managed with IV medications. Bed in low locked position and call light within reach.  Will continue to monitor.

## 2017-05-31 NOTE — ASSESSMENT & PLAN NOTE
Hold diuretic   Obtain urine for routine UA  Avoid non-essential nephrotoxins, dose medications according to GFR  Monitor I/O, daily weight. Monitor for volume overload  Check urine sodium, creatinine - calculate FENa  Check urine uric acid - calculate FEUrea  Consider checking urine eosinophils  Consider checking CPK  Consider renal bladder US to rule out obstructive nephropathy

## 2017-05-31 NOTE — SUBJECTIVE & OBJECTIVE
Past Medical History:   Diagnosis Date    Atrial fibrillation     Cigarette smoker     COPD (chronic obstructive pulmonary disease)     Diabetes mellitus     Heart attack     Hypertension     Intractable epilepsy with complex partial seizures 11/13/2016    Stroke        Past Surgical History:   Procedure Laterality Date    CARDIAC PACEMAKER PLACEMENT      per patient 2014    HYSTERECTOMY      LEG AMPUTATION Left 2014    AKA       Review of patient's allergies indicates:  No Known Allergies    No current facility-administered medications on file prior to encounter.      Current Outpatient Prescriptions on File Prior to Encounter   Medication Sig    atorvastatin (LIPITOR) 40 MG tablet Take 40 mg by mouth once daily.     baclofen (LIORESAL) 10 MG tablet Take 10 mg by mouth 2 (two) times daily.    lacosamide 150 mg Tab Take 1 tablet (150 mg total) by mouth 2 (two) times daily.    metoprolol succinate (TOPROL-XL) 200 MG 24 hr tablet Take 1 tablet (200 mg total) by mouth once daily.    pantoprazole (PROTONIX) 40 MG tablet Take 40 mg by mouth once daily.    rivaroxaban (XARELTO) 20 mg Tab Take 20 mg by mouth daily with dinner or evening meal.    torsemide (DEMADEX) 20 MG Tab Take 1 tablet (20 mg total) by mouth once daily.     Family History     Problem Relation (Age of Onset)    Cancer Mother    No Known Problems Father        Social History Main Topics    Smoking status: Current Every Day Smoker     Packs/day: 1.00     Years: 30.00    Smokeless tobacco: Never Used    Alcohol use No    Drug use: No    Sexual activity: Not Currently     Review of Systems   Constitutional: Negative for chills, fever and unexpected weight change.   HENT: Negative for congestion and sore throat.    Eyes: Negative for photophobia and visual disturbance.   Respiratory: Negative for chest tightness and shortness of breath.    Cardiovascular: Negative for chest pain, palpitations and leg swelling.   Gastrointestinal:  Positive for abdominal pain and nausea. Negative for abdominal distention, blood in stool, constipation and diarrhea.   Genitourinary: Negative for dysuria and hematuria.   Musculoskeletal: Negative for neck pain and neck stiffness.   Skin: Negative for rash and wound.   Neurological: Negative for dizziness and syncope.   Psychiatric/Behavioral: Negative for dysphoric mood. The patient is not nervous/anxious.      Objective:     Vital Signs (Most Recent):  Temp: 98.1 °F (36.7 °C) (05/31/17 0700)  Pulse: 95 (05/31/17 0700)  Resp: 18 (05/31/17 0700)  BP: (!) 178/71 (05/31/17 0700)  SpO2: 95 % (05/31/17 0700) Vital Signs (24h Range):  Temp:  [97.7 °F (36.5 °C)-98.1 °F (36.7 °C)] 98.1 °F (36.7 °C)  Pulse:  [76-95] 95  Resp:  [18] 18  SpO2:  [93 %-100 %] 95 %  BP: (140-178)/(71-80) 178/71     Weight: 70.3 kg (155 lb)  Body mass index is 30.27 kg/m².    Physical Exam   Constitutional: She is oriented to person, place, and time. She appears well-developed and well-nourished.   HENT:   Head: Normocephalic and atraumatic.   Eyes: Right eye exhibits no discharge. Left eye exhibits no discharge. No scleral icterus.   Neck: Neck supple. No JVD present.   Cardiovascular: Normal rate and regular rhythm.    Pulmonary/Chest: Effort normal. No respiratory distress.   Abdominal: Soft. Bowel sounds are normal. She exhibits no distension. There is no tenderness. There is no rebound and no guarding.   Musculoskeletal: She exhibits no edema or tenderness.   LEFT BKA   Neurological: She is alert and oriented to person, place, and time.   Skin: Skin is warm and dry.   Psychiatric: She has a normal mood and affect. Her behavior is normal.   Nursing note and vitals reviewed.       Significant Labs:   Recent Lab Results       05/31/17  0635 05/30/17  1500      Albumin 2.5(L) 3.1(L)     Alkaline Phosphatase 206(H) 98     ALT 27 7(L)     Anion Gap 9 12     Aniso  Moderate     AST 51(H) 15     Baso # 0.10 0.00     Basophil% 0.4 0.1     Total  Bilirubin 0.5  Comment:  For infants and newborns, interpretation of results should be based  on gestational age, weight and in agreement with clinical  observations.  Premature Infant recommended reference ranges:  Up to 24 hours.............<8.0 mg/dL  Up to 48 hours............<12.0 mg/dL  3-5 days..................<15.0 mg/dL  6-29 days.................<15.0 mg/dL   0.5  Comment:  For infants and newborns, interpretation of results should be based  on gestational age, weight and in agreement with clinical  observations.  Premature Infant recommended reference ranges:  Up to 24 hours.............<8.0 mg/dL  Up to 48 hours............<12.0 mg/dL  3-5 days..................<15.0 mg/dL  6-29 days.................<15.0 mg/dL       BUN, Bld 21 30(H)     Calcium 8.8 9.8     Chloride 109 106     CO2 20(L) 23     Creatinine 1.2 1.6(H)     Differential Method Automated Automated     eGFR if  54(A) 38(A)     eGFR if non  47  Comment:  Calculation used to obtain the estimated glomerular filtration  rate (eGFR) is the CKD-EPI equation. Since race is unknown   in our information system, the eGFR values for   -American and Non--American patients are given   for each creatinine result.  (A) 33  Comment:  Calculation used to obtain the estimated glomerular filtration  rate (eGFR) is the CKD-EPI equation. Since race is unknown   in our information system, the eGFR values for   -American and Non--American patients are given   for each creatinine result.  (A)     Eos # 0.1 0.0     Eosinophil% 0.8 0.1     Glucose 89 125(H)     Gran # 12.8(H) 17.4(H)     Gran% 76.7(H) 87.3(H)     Hematocrit 31.9(L) 36.5(L)     Hemoglobin 10.1(L) 11.6(L)     Hypo  Occasional     Lipase  32     Lymph # 2.0 1.7     Lymph% 12.0(L) 8.7(L)     Magnesium 1.6      MCH 22.8(L) 22.8(L)     MCHC 31.8(L) 31.8(L)     MCV 72(L) 72(L)     Mono # 1.7(H) 0.8     Mono% 10.1 3.8(L)     MPV 7.7(L) 7.4(L)      Ovalocytes  Occasional     Phosphorus 4.1      Platelets 462(H) 617(H)     Potassium 4.3 4.3     Total Protein 6.5 8.1     RBC 4.45 5.08     RDW 23.9(H) 24.4(H)     Sodium 138 141     WBC 16.70(H) 20.00(H)         All pertinent labs within the past 24 hours have been reviewed.    Significant Imaging:     Procedure Component Value Units Date/Time   US Abdomen Limited [[id number]] Resulted: 05/31/17 0817   Order Status: Completed Updated: 05/31/17 0818   Narrative:     Sagittal and transverse images were obtained through the right abdomen.    The liver is of normal size contour and echogenicity without a focal mass.  The gallbladder is small and contains sludge and a gallstone.  Pericholecystic edema or fluid is not seen..  The common bile duct is mildly dilated measuring 9mm.     The right kidney measures 11cm without mass, cyst or hydronephrosis.  The pancreas appears of normal contour and echogenicity.The abdominal aorta and inferior vena cava are of normal caliber.   Impression:      Cholelithiasis.  Mildly dilated common bile duct without a point of obstruction seen      Electronically signed by: Kevin Cullen MD  Date: 05/31/17  Time: 08:17    CT Abdomen Pelvis Without Contrast [[id number]] Resulted: 05/30/17 1631   Order Status: Completed Updated: 05/30/17 1632   Narrative:     CT Abdomen and Pelvis without contrast    Comparison: 03/15/2016    Technique:  Helically acquired axial images of the abdomen and pelvis were acquired without contrast.   Reformatted coronal and sagittal images provided.    FINDINGS: There is moderate fat stranding about the head of the pancreas, within the pancreaticoduodenal groove, and about the descending limb of the duodenum which exhibits circumferential mural thickening.  No associated fluid collection.    There is a single coarse calcification within the mid body of the pancreas.  The pancreatic duct is nondilated.    The gallbladder is contracted.  It contains high density  material which may reflect sludge, small stone measuring 2-3 mm is present at the neck of the gallbladder best seen on the coronal series.  No gallbladder wall thickening or pericholecystic fluid is evident.  There is dilatation of the common duct proximal to the above-described pancreatic or duodenal inflammatory process, to 15 mm diameter compared with 7 mm previously.    Cardiac pacer leads are partially seen.  There has been a prior mitral valve repair.  The liver, spleen, and adrenal glands are unremarkable.    The small bowel is normal in caliber.  The appendix is normal.  A few colonic diverticula are present.  There has been a hysterectomy.  There is moderate calcification of aorta without aneurysm.    There are multiple bilateral nonobstructing renal stones measuring up to 6 mm.  There is a 2.2 cm lesion arising partially exophytically from the posterior lateral lower pole left kidney, demonstrating density above that to allow for characterization as a simple cyst.    There has been a right total hip arthroplasty.  The bones are osteopenic.   Impression:       1.  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  2.  Moderate dilatation of the common bile duct likely related to the pancreaticoduodenal process.  3.  Cholelithiasis.  4.  Left renal lesion probable cyst, however confirmation with renal ultrasound is recommended.

## 2017-05-31 NOTE — PLAN OF CARE
Problem: Patient Care Overview  Goal: Plan of Care Review  Outcome: Ongoing (interventions implemented as appropriate)  Pt on room air, no PRN duoneb not needed at this time

## 2017-05-31 NOTE — HPI
"Miss Landa presents for evaluation of abdominal pain which has been present for 1-2 weeks. The pain is worse after she eats. The pain is throughout her abdomen. She denies known alleviating factors other than analgesics she has received here. She reports being evaluated at another facility and being told she would need a cholecystectomy. She was evaluated here in the ED and underwent CT abdomen which revealed findings suggestive of "groove pancreatitis"  and dilated CBD, possibly due to pancreaticoduodenal process. She denies alcohol consumption, trauma, new medication. She smokes cigarettes. She denies weight loss. She states her mother was diagnosed with either biliary or liver cancer. She reports history of PUD. She denies using NSAID recently. She denies fever.  "

## 2017-05-31 NOTE — H&P
"[facility] Medicine  History & Physical    Patient Name: Nelly Landa  MRN: [id number]  Admission Date: 5/30/2017  Attending Physician: Shirlene Rodriguez MD   Primary Care Provider: Kyle Acuna MD         Patient information was obtained from patient, past medical records and ER records.     Subjective:     Principal Problem:Pancreatitis, acute    Chief Complaint:   Chief Complaint   Patient presents with    Abdominal Pain        HPI: Miss Landa presents for evaluation of abdominal pain which has been present for 1-2 weeks. The pain is worse after she eats. The pain is throughout her abdomen. She denies known alleviating factors other than analgesics she has received here. She reports being evaluated at another facility and being told she would need a cholecystectomy. She was evaluated here in the ED and underwent CT abdomen which revealed findings suggestive of "groove pancreatitis"  and dilated CBD, possibly due to pancreaticoduodenal process. She denies alcohol consumption, trauma, new medication. She smokes cigarettes. She denies weight loss. She states her mother was diagnosed with either biliary or liver cancer. She reports history of PUD. She denies using NSAID recently. She denies fever.    Past Medical History:   Diagnosis Date    Atrial fibrillation     Cigarette smoker     COPD (chronic obstructive pulmonary disease)     Diabetes mellitus     Heart attack     Hypertension     Intractable epilepsy with complex partial seizures 11/13/2016    Stroke        Past Surgical History:   Procedure Laterality Date    CARDIAC PACEMAKER PLACEMENT      per patient 2014    HYSTERECTOMY      LEG AMPUTATION Left 2014    AKA       Review of patient's allergies indicates:  No Known Allergies    No current facility-administered medications on file prior to encounter.      Current Outpatient Prescriptions on File Prior to Encounter   Medication Sig    atorvastatin (LIPITOR) 40 MG " tablet Take 40 mg by mouth once daily.     baclofen (LIORESAL) 10 MG tablet Take 10 mg by mouth 2 (two) times daily.    lacosamide 150 mg Tab Take 1 tablet (150 mg total) by mouth 2 (two) times daily.    metoprolol succinate (TOPROL-XL) 200 MG 24 hr tablet Take 1 tablet (200 mg total) by mouth once daily.    pantoprazole (PROTONIX) 40 MG tablet Take 40 mg by mouth once daily.    rivaroxaban (XARELTO) 20 mg Tab Take 20 mg by mouth daily with dinner or evening meal.    torsemide (DEMADEX) 20 MG Tab Take 1 tablet (20 mg total) by mouth once daily.     Family History     Problem Relation (Age of Onset)    Cancer Mother    No Known Problems Father        Social History Main Topics    Smoking status: Current Every Day Smoker     Packs/day: 1.00     Years: 30.00    Smokeless tobacco: Never Used    Alcohol use No    Drug use: No    Sexual activity: Not Currently     Review of Systems   Constitutional: Negative for chills, fever and unexpected weight change.   HENT: Negative for congestion and sore throat.    Eyes: Negative for photophobia and visual disturbance.   Respiratory: Negative for chest tightness and shortness of breath.    Cardiovascular: Negative for chest pain, palpitations and leg swelling.   Gastrointestinal: Positive for abdominal pain and nausea. Negative for abdominal distention, blood in stool, constipation and diarrhea.   Genitourinary: Negative for dysuria and hematuria.   Musculoskeletal: Negative for neck pain and neck stiffness.   Skin: Negative for rash and wound.   Neurological: Negative for dizziness and syncope.   Psychiatric/Behavioral: Negative for dysphoric mood. The patient is not nervous/anxious.      Objective:     Vital Signs (Most Recent):  Temp: 98.1 °F (36.7 °C) (05/31/17 0700)  Pulse: 95 (05/31/17 0700)  Resp: 18 (05/31/17 0700)  BP: (!) 178/71 (05/31/17 0700)  SpO2: 95 % (05/31/17 0700) Vital Signs (24h Range):  Temp:  [97.7 °F (36.5 °C)-98.1 °F (36.7 °C)] 98.1 °F (36.7  °C)  Pulse:  [76-95] 95  Resp:  [18] 18  SpO2:  [93 %-100 %] 95 %  BP: (140-178)/(71-80) 178/71     Weight: 70.3 kg (155 lb)  Body mass index is 30.27 kg/m².    Physical Exam   Constitutional: She is oriented to person, place, and time. She appears well-developed and well-nourished.   HENT:   Head: Normocephalic and atraumatic.   Eyes: Right eye exhibits no discharge. Left eye exhibits no discharge. No scleral icterus.   Neck: Neck supple. No JVD present.   Cardiovascular: Normal rate and regular rhythm.    Pulmonary/Chest: Effort normal. No respiratory distress.   Abdominal: Soft. Bowel sounds are normal. She exhibits no distension. There is no tenderness. There is no rebound and no guarding.   Musculoskeletal: She exhibits no edema or tenderness.   LEFT BKA   Neurological: She is alert and oriented to person, place, and time.   Skin: Skin is warm and dry.   Psychiatric: She has a normal mood and affect. Her behavior is normal.   Nursing note and vitals reviewed.       Significant Labs:   Recent Lab Results       05/31/17  0635 05/30/17  1500      Albumin 2.5(L) 3.1(L)     Alkaline Phosphatase 206(H) 98     ALT 27 7(L)     Anion Gap 9 12     Aniso  Moderate     AST 51(H) 15     Baso # 0.10 0.00     Basophil% 0.4 0.1     Total Bilirubin 0.5  Comment:  For infants and newborns, interpretation of results should be based  on gestational age, weight and in agreement with clinical  observations.  Premature Infant recommended reference ranges:  Up to 24 hours.............<8.0 mg/dL  Up to 48 hours............<12.0 mg/dL  3-5 days..................<15.0 mg/dL  6-29 days.................<15.0 mg/dL   0.5  Comment:  For infants and newborns, interpretation of results should be based  on gestational age, weight and in agreement with clinical  observations.  Premature Infant recommended reference ranges:  Up to 24 hours.............<8.0 mg/dL  Up to 48 hours............<12.0 mg/dL  3-5 days..................<15.0 mg/dL  6-29  days.................<15.0 mg/dL       BUN, Bld 21 30(H)     Calcium 8.8 9.8     Chloride 109 106     CO2 20(L) 23     Creatinine 1.2 1.6(H)     Differential Method Automated Automated     eGFR if  54(A) 38(A)     eGFR if non  47  Comment:  Calculation used to obtain the estimated glomerular filtration  rate (eGFR) is the CKD-EPI equation. Since race is unknown   in our information system, the eGFR values for   -American and Non--American patients are given   for each creatinine result.  (A) 33  Comment:  Calculation used to obtain the estimated glomerular filtration  rate (eGFR) is the CKD-EPI equation. Since race is unknown   in our information system, the eGFR values for   -American and Non--American patients are given   for each creatinine result.  (A)     Eos # 0.1 0.0     Eosinophil% 0.8 0.1     Glucose 89 125(H)     Gran # 12.8(H) 17.4(H)     Gran% 76.7(H) 87.3(H)     Hematocrit 31.9(L) 36.5(L)     Hemoglobin 10.1(L) 11.6(L)     Hypo  Occasional     Lipase  32     Lymph # 2.0 1.7     Lymph% 12.0(L) 8.7(L)     Magnesium 1.6      MCH 22.8(L) 22.8(L)     MCHC 31.8(L) 31.8(L)     MCV 72(L) 72(L)     Mono # 1.7(H) 0.8     Mono% 10.1 3.8(L)     MPV 7.7(L) 7.4(L)     Ovalocytes  Occasional     Phosphorus 4.1      Platelets 462(H) 617(H)     Potassium 4.3 4.3     Total Protein 6.5 8.1     RBC 4.45 5.08     RDW 23.9(H) 24.4(H)     Sodium 138 141     WBC 16.70(H) 20.00(H)         All pertinent labs within the past 24 hours have been reviewed.    Significant Imaging:     Procedure Component Value Units Date/Time   US Abdomen Limited [[id number]] Resulted: 05/31/17 0817   Order Status: Completed Updated: 05/31/17 0818   Narrative:     Sagittal and transverse images were obtained through the right abdomen.    The liver is of normal size contour and echogenicity without a focal mass.  The gallbladder is small and contains sludge and a gallstone.  Pericholecystic  edema or fluid is not seen..  The common bile duct is mildly dilated measuring 9mm.     The right kidney measures 11cm without mass, cyst or hydronephrosis.  The pancreas appears of normal contour and echogenicity.The abdominal aorta and inferior vena cava are of normal caliber.   Impression:      Cholelithiasis.  Mildly dilated common bile duct without a point of obstruction seen      Electronically signed by: Kevin Cullen MD  Date: 05/31/17  Time: 08:17    CT Abdomen Pelvis Without Contrast [[id number]] Resulted: 05/30/17 1631   Order Status: Completed Updated: 05/30/17 1632   Narrative:     CT Abdomen and Pelvis without contrast    Comparison: 03/15/2016    Technique:  Helically acquired axial images of the abdomen and pelvis were acquired without contrast.   Reformatted coronal and sagittal images provided.    FINDINGS: There is moderate fat stranding about the head of the pancreas, within the pancreaticoduodenal groove, and about the descending limb of the duodenum which exhibits circumferential mural thickening.  No associated fluid collection.    There is a single coarse calcification within the mid body of the pancreas.  The pancreatic duct is nondilated.    The gallbladder is contracted.  It contains high density material which may reflect sludge, small stone measuring 2-3 mm is present at the neck of the gallbladder best seen on the coronal series.  No gallbladder wall thickening or pericholecystic fluid is evident.  There is dilatation of the common duct proximal to the above-described pancreatic or duodenal inflammatory process, to 15 mm diameter compared with 7 mm previously.    Cardiac pacer leads are partially seen.  There has been a prior mitral valve repair.  The liver, spleen, and adrenal glands are unremarkable.    The small bowel is normal in caliber.  The appendix is normal.  A few colonic diverticula are present.  There has been a hysterectomy.  There is moderate calcification of aorta  without aneurysm.    There are multiple bilateral nonobstructing renal stones measuring up to 6 mm.  There is a 2.2 cm lesion arising partially exophytically from the posterior lateral lower pole left kidney, demonstrating density above that to allow for characterization as a simple cyst.    There has been a right total hip arthroplasty.  The bones are osteopenic.   Impression:       1.  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  2.  Moderate dilatation of the common bile duct likely related to the pancreaticoduodenal process.  3.  Cholelithiasis.  4.  Left renal lesion probable cyst, however confirmation with renal ultrasound is recommended.         Assessment/Plan:     COPD (chronic obstructive pulmonary disease)    PRN Duoneb  Discussed smoking cessation in detail. Start Nicotine patch.          Seizure disorder    Continue Lacosamide          Thrombocytosis    Reactive  Trend          Essential hypertension    Continue Metoprolol          REX (acute kidney injury)    Hold diuretic   Obtain urine for routine UA  Avoid non-essential nephrotoxins, dose medications according to GFR  Monitor I/O, daily weight. Monitor for volume overload  Check urine sodium, creatinine - calculate FENa  Check urine uric acid - calculate FEUrea  Consider checking urine eosinophils  Consider checking CPK  Consider renal bladder US to rule out obstructive nephropathy            Chronic diastolic congestive heart failure    Continue Metoprolol  Hold diuretic for now  Monitor I/O, daily weight, especially while receiving IVF          Atrial fibrillation    Continue Metoprolol, Digoxin          * Pancreatitis, acute    NPO  IVF  PRN analgesics  Consult GE for possible EGD to evaluate duodenum  Consult surgeon for possible cholecystectomy            VTE Risk Mitigation         Ordered     heparin (porcine) injection 5,000 Units  Every 12 hours     Route:  Subcutaneous        05/30/17 1260     Medium Risk of  VTE  Once      05/30/17 2246        Chari Rojas PA-C  Department of Hospital Medicine   [facility]

## 2017-05-31 NOTE — ASSESSMENT & PLAN NOTE
Continue Metoprolol  Hold diuretic for now  Monitor I/O, daily weight, especially while receiving IVF

## 2017-06-01 ENCOUNTER — ANESTHESIA EVENT (OUTPATIENT)
Dept: SURGERY | Facility: HOSPITAL | Age: 67
DRG: 418 | End: 2017-06-01
Payer: MEDICARE

## 2017-06-01 ENCOUNTER — SURGERY (OUTPATIENT)
Age: 67
End: 2017-06-01

## 2017-06-01 LAB
ALBUMIN SERPL BCP-MCNC: 2.2 G/DL
ALP SERPL-CCNC: 139 U/L
ALT SERPL W/O P-5'-P-CCNC: 15 U/L
ANION GAP SERPL CALC-SCNC: 7 MMOL/L
AST SERPL-CCNC: 19 U/L
BASOPHILS # BLD AUTO: 0 K/UL
BASOPHILS NFR BLD: 0.1 %
BILIRUB SERPL-MCNC: 0.2 MG/DL
BUN SERPL-MCNC: 15 MG/DL
CALCIUM SERPL-MCNC: 8.4 MG/DL
CHLORIDE SERPL-SCNC: 111 MMOL/L
CO2 SERPL-SCNC: 21 MMOL/L
CREAT SERPL-MCNC: 1.4 MG/DL
DIFFERENTIAL METHOD: ABNORMAL
EOSINOPHIL # BLD AUTO: 0.2 K/UL
EOSINOPHIL NFR BLD: 1.2 %
ERYTHROCYTE [DISTWIDTH] IN BLOOD BY AUTOMATED COUNT: 23.4 %
EST. GFR  (AFRICAN AMERICAN): 45 ML/MIN/1.73 M^2
EST. GFR  (NON AFRICAN AMERICAN): 39 ML/MIN/1.73 M^2
GLUCOSE SERPL-MCNC: 80 MG/DL
HCT VFR BLD AUTO: 27.9 %
HGB BLD-MCNC: 9 G/DL
LYMPHOCYTES # BLD AUTO: 2.3 K/UL
LYMPHOCYTES NFR BLD: 17.2 %
MAGNESIUM SERPL-MCNC: 1.6 MG/DL
MCH RBC QN AUTO: 23 PG
MCHC RBC AUTO-ENTMCNC: 32.2 %
MCV RBC AUTO: 71 FL
MONOCYTES # BLD AUTO: 1.2 K/UL
MONOCYTES NFR BLD: 8.9 %
NEUTROPHILS # BLD AUTO: 9.6 K/UL
NEUTROPHILS NFR BLD: 72.6 %
PHOSPHATE SERPL-MCNC: 3.9 MG/DL
PLATELET # BLD AUTO: 369 K/UL
PMV BLD AUTO: 7.9 FL
POTASSIUM SERPL-SCNC: 4.1 MMOL/L
PROT SERPL-MCNC: 5.6 G/DL
RBC # BLD AUTO: 3.91 M/UL
SODIUM SERPL-SCNC: 139 MMOL/L
WBC # BLD AUTO: 13.3 K/UL

## 2017-06-01 PROCEDURE — 99900035 HC TECH TIME PER 15 MIN (STAT)

## 2017-06-01 PROCEDURE — D9220A PRA ANESTHESIA: Mod: CRNA,,, | Performed by: NURSE ANESTHETIST, CERTIFIED REGISTERED

## 2017-06-01 PROCEDURE — 93005 ELECTROCARDIOGRAM TRACING: CPT

## 2017-06-01 PROCEDURE — D9220A PRA ANESTHESIA: Mod: ANES,,, | Performed by: ANESTHESIOLOGY

## 2017-06-01 PROCEDURE — 36000708 HC OR TIME LEV III 1ST 15 MIN: Performed by: SURGERY

## 2017-06-01 PROCEDURE — 12000002 HC ACUTE/MED SURGE SEMI-PRIVATE ROOM

## 2017-06-01 PROCEDURE — 0FT44ZZ RESECTION OF GALLBLADDER, PERCUTANEOUS ENDOSCOPIC APPROACH: ICD-10-PCS | Performed by: SURGERY

## 2017-06-01 PROCEDURE — 36415 COLL VENOUS BLD VENIPUNCTURE: CPT

## 2017-06-01 PROCEDURE — 63600175 PHARM REV CODE 636 W HCPCS: Performed by: PHYSICIAN ASSISTANT

## 2017-06-01 PROCEDURE — 84100 ASSAY OF PHOSPHORUS: CPT

## 2017-06-01 PROCEDURE — 85025 COMPLETE CBC W/AUTO DIFF WBC: CPT

## 2017-06-01 PROCEDURE — 99232 SBSQ HOSP IP/OBS MODERATE 35: CPT | Mod: ,,, | Performed by: INTERNAL MEDICINE

## 2017-06-01 PROCEDURE — 27000221 HC OXYGEN, UP TO 24 HOURS

## 2017-06-01 PROCEDURE — 80053 COMPREHEN METABOLIC PANEL: CPT

## 2017-06-01 PROCEDURE — 25000003 PHARM REV CODE 250: Performed by: EMERGENCY MEDICINE

## 2017-06-01 PROCEDURE — 71000033 HC RECOVERY, INTIAL HOUR: Performed by: SURGERY

## 2017-06-01 PROCEDURE — 25000003 PHARM REV CODE 250: Performed by: SURGERY

## 2017-06-01 PROCEDURE — 25000003 PHARM REV CODE 250: Performed by: PHYSICIAN ASSISTANT

## 2017-06-01 PROCEDURE — 99900103 DSU ONLY-NO CHARGE-INITIAL HR (STAT): Performed by: SURGERY

## 2017-06-01 PROCEDURE — 37000008 HC ANESTHESIA 1ST 15 MINUTES: Performed by: SURGERY

## 2017-06-01 PROCEDURE — 99900104 DSU ONLY-NO CHARGE-EA ADD'L HR (STAT): Performed by: SURGERY

## 2017-06-01 PROCEDURE — 27201423 OPTIME MED/SURG SUP & DEVICES STERILE SUPPLY: Performed by: SURGERY

## 2017-06-01 PROCEDURE — 88304 TISSUE EXAM BY PATHOLOGIST: CPT | Performed by: PATHOLOGY

## 2017-06-01 PROCEDURE — 25000003 PHARM REV CODE 250: Performed by: INTERNAL MEDICINE

## 2017-06-01 PROCEDURE — 94761 N-INVAS EAR/PLS OXIMETRY MLT: CPT

## 2017-06-01 PROCEDURE — 36000709 HC OR TIME LEV III EA ADD 15 MIN: Performed by: SURGERY

## 2017-06-01 PROCEDURE — 63600175 PHARM REV CODE 636 W HCPCS: Performed by: INTERNAL MEDICINE

## 2017-06-01 PROCEDURE — 37000009 HC ANESTHESIA EA ADD 15 MINS: Performed by: SURGERY

## 2017-06-01 PROCEDURE — 63600175 PHARM REV CODE 636 W HCPCS: Performed by: SURGERY

## 2017-06-01 PROCEDURE — C1729 CATH, DRAINAGE: HCPCS | Performed by: SURGERY

## 2017-06-01 PROCEDURE — 83735 ASSAY OF MAGNESIUM: CPT

## 2017-06-01 PROCEDURE — 25000003 PHARM REV CODE 250: Performed by: NURSE ANESTHETIST, CERTIFIED REGISTERED

## 2017-06-01 PROCEDURE — 63600175 PHARM REV CODE 636 W HCPCS: Performed by: NURSE ANESTHETIST, CERTIFIED REGISTERED

## 2017-06-01 PROCEDURE — 71000039 HC RECOVERY, EACH ADD'L HOUR: Performed by: SURGERY

## 2017-06-01 PROCEDURE — 47562 LAPAROSCOPIC CHOLECYSTECTOMY: CPT | Mod: 22,,, | Performed by: SURGERY

## 2017-06-01 RX ORDER — PHENYLEPHRINE HYDROCHLORIDE 10 MG/ML
INJECTION INTRAVENOUS
Status: DISCONTINUED | OUTPATIENT
Start: 2017-06-01 | End: 2017-06-01

## 2017-06-01 RX ORDER — SODIUM CHLORIDE 0.9 % (FLUSH) 0.9 %
3 SYRINGE (ML) INJECTION
Status: DISCONTINUED | OUTPATIENT
Start: 2017-06-01 | End: 2017-06-03 | Stop reason: HOSPADM

## 2017-06-01 RX ORDER — FENTANYL CITRATE 50 UG/ML
INJECTION, SOLUTION INTRAMUSCULAR; INTRAVENOUS
Status: DISCONTINUED | OUTPATIENT
Start: 2017-06-01 | End: 2017-06-01

## 2017-06-01 RX ORDER — PROPOFOL 10 MG/ML
VIAL (ML) INTRAVENOUS
Status: DISCONTINUED | OUTPATIENT
Start: 2017-06-01 | End: 2017-06-01

## 2017-06-01 RX ORDER — FENTANYL CITRATE 50 UG/ML
25 INJECTION, SOLUTION INTRAMUSCULAR; INTRAVENOUS EVERY 5 MIN PRN
Status: DISCONTINUED | OUTPATIENT
Start: 2017-06-01 | End: 2017-06-01

## 2017-06-01 RX ORDER — DEXAMETHASONE SODIUM PHOSPHATE 4 MG/ML
INJECTION, SOLUTION INTRA-ARTICULAR; INTRALESIONAL; INTRAMUSCULAR; INTRAVENOUS; SOFT TISSUE
Status: DISCONTINUED | OUTPATIENT
Start: 2017-06-01 | End: 2017-06-01

## 2017-06-01 RX ORDER — NEOSTIGMINE METHYLSULFATE 1 MG/ML
INJECTION, SOLUTION INTRAVENOUS
Status: DISCONTINUED | OUTPATIENT
Start: 2017-06-01 | End: 2017-06-01

## 2017-06-01 RX ORDER — ONDANSETRON 2 MG/ML
INJECTION INTRAMUSCULAR; INTRAVENOUS
Status: DISCONTINUED | OUTPATIENT
Start: 2017-06-01 | End: 2017-06-01

## 2017-06-01 RX ORDER — ROCURONIUM BROMIDE 10 MG/ML
INJECTION, SOLUTION INTRAVENOUS
Status: DISCONTINUED | OUTPATIENT
Start: 2017-06-01 | End: 2017-06-01

## 2017-06-01 RX ORDER — SODIUM CHLORIDE, SODIUM LACTATE, POTASSIUM CHLORIDE, CALCIUM CHLORIDE 600; 310; 30; 20 MG/100ML; MG/100ML; MG/100ML; MG/100ML
125 INJECTION, SOLUTION INTRAVENOUS CONTINUOUS
Status: DISCONTINUED | OUTPATIENT
Start: 2017-06-01 | End: 2017-06-01

## 2017-06-01 RX ORDER — CEFAZOLIN SODIUM 2 G/50ML
2 SOLUTION INTRAVENOUS ONCE
Status: COMPLETED | OUTPATIENT
Start: 2017-06-01 | End: 2017-06-01

## 2017-06-01 RX ORDER — HYDRALAZINE HYDROCHLORIDE 20 MG/ML
10 INJECTION INTRAMUSCULAR; INTRAVENOUS
Status: DISCONTINUED | OUTPATIENT
Start: 2017-06-01 | End: 2017-06-03 | Stop reason: HOSPADM

## 2017-06-01 RX ORDER — BUPIVACAINE HYDROCHLORIDE AND EPINEPHRINE 5; 5 MG/ML; UG/ML
INJECTION, SOLUTION EPIDURAL; INTRACAUDAL; PERINEURAL
Status: DISCONTINUED | OUTPATIENT
Start: 2017-06-01 | End: 2017-06-01 | Stop reason: HOSPADM

## 2017-06-01 RX ORDER — GLYCOPYRROLATE 0.2 MG/ML
INJECTION INTRAMUSCULAR; INTRAVENOUS
Status: DISCONTINUED | OUTPATIENT
Start: 2017-06-01 | End: 2017-06-01

## 2017-06-01 RX ORDER — OXYCODONE HYDROCHLORIDE 5 MG/1
5 TABLET ORAL
Status: DISCONTINUED | OUTPATIENT
Start: 2017-06-01 | End: 2017-06-01

## 2017-06-01 RX ORDER — LIDOCAINE HCL/PF 100 MG/5ML
SYRINGE (ML) INTRAVENOUS
Status: DISCONTINUED | OUTPATIENT
Start: 2017-06-01 | End: 2017-06-01

## 2017-06-01 RX ORDER — MIDAZOLAM HYDROCHLORIDE 1 MG/ML
INJECTION, SOLUTION INTRAMUSCULAR; INTRAVENOUS
Status: DISCONTINUED | OUTPATIENT
Start: 2017-06-01 | End: 2017-06-01

## 2017-06-01 RX ADMIN — FLUOXETINE 20 MG: 20 CAPSULE ORAL at 09:06

## 2017-06-01 RX ADMIN — ROCURONIUM BROMIDE 10 MG: 10 INJECTION, SOLUTION INTRAVENOUS at 06:06

## 2017-06-01 RX ADMIN — HEPARIN SODIUM 5000 UNITS: 5000 INJECTION, SOLUTION INTRAVENOUS; SUBCUTANEOUS at 09:06

## 2017-06-01 RX ADMIN — NICOTINE 1 PATCH: 21 PATCH, EXTENDED RELEASE TRANSDERMAL at 09:06

## 2017-06-01 RX ADMIN — BUPIVACAINE HYDROCHLORIDE AND EPINEPHRINE BITARTRATE 30 ML: 5; .0091 INJECTION, SOLUTION EPIDURAL; INTRACAUDAL; PERINEURAL at 05:06

## 2017-06-01 RX ADMIN — LACOSAMIDE 150 MG: 10 SOLUTION ORAL at 09:06

## 2017-06-01 RX ADMIN — GLYCOPYRROLATE 0.4 MG: 0.2 INJECTION, SOLUTION INTRAMUSCULAR; INTRAVENOUS at 07:06

## 2017-06-01 RX ADMIN — SODIUM CHLORIDE, SODIUM LACTATE, POTASSIUM CHLORIDE, AND CALCIUM CHLORIDE: .6; .31; .03; .02 INJECTION, SOLUTION INTRAVENOUS at 07:06

## 2017-06-01 RX ADMIN — MORPHINE SULFATE 4 MG: 4 INJECTION INTRAVENOUS at 06:06

## 2017-06-01 RX ADMIN — FENTANYL CITRATE 50 MCG: 50 INJECTION INTRAMUSCULAR; INTRAVENOUS at 05:06

## 2017-06-01 RX ADMIN — SUCRALFATE 1 G: 1 SUSPENSION ORAL at 12:06

## 2017-06-01 RX ADMIN — ATORVASTATIN CALCIUM 40 MG: 40 TABLET, FILM COATED ORAL at 09:06

## 2017-06-01 RX ADMIN — DEXAMETHASONE SODIUM PHOSPHATE 4 MG: 4 INJECTION, SOLUTION INTRAMUSCULAR; INTRAVENOUS at 05:06

## 2017-06-01 RX ADMIN — MORPHINE SULFATE 4 MG: 4 INJECTION INTRAVENOUS at 11:06

## 2017-06-01 RX ADMIN — SUCRALFATE 1 G: 1 SUSPENSION ORAL at 06:06

## 2017-06-01 RX ADMIN — FENTANYL CITRATE 50 MCG: 50 INJECTION INTRAMUSCULAR; INTRAVENOUS at 06:06

## 2017-06-01 RX ADMIN — PHENYLEPHRINE HYDROCHLORIDE 200 MCG: 10 INJECTION INTRAVENOUS at 05:06

## 2017-06-01 RX ADMIN — PHENYLEPHRINE HYDROCHLORIDE 100 MCG: 10 INJECTION INTRAVENOUS at 06:06

## 2017-06-01 RX ADMIN — MORPHINE SULFATE 4 MG: 4 INJECTION INTRAVENOUS at 10:06

## 2017-06-01 RX ADMIN — BACLOFEN 10 MG: 10 TABLET ORAL at 09:06

## 2017-06-01 RX ADMIN — DIGOXIN 250 MCG: 0.12 TABLET ORAL at 09:06

## 2017-06-01 RX ADMIN — PANTOPRAZOLE SODIUM 40 MG: 40 TABLET, DELAYED RELEASE ORAL at 09:06

## 2017-06-01 RX ADMIN — NEOSTIGMINE METHYLSULFATE 3 MG: 1 INJECTION INTRAVENOUS at 07:06

## 2017-06-01 RX ADMIN — MORPHINE SULFATE 4 MG: 4 INJECTION INTRAVENOUS at 12:06

## 2017-06-01 RX ADMIN — ROCURONIUM BROMIDE 30 MG: 10 INJECTION, SOLUTION INTRAVENOUS at 05:06

## 2017-06-01 RX ADMIN — MIDAZOLAM 2 MG: 1 INJECTION INTRAMUSCULAR; INTRAVENOUS at 05:06

## 2017-06-01 RX ADMIN — PHENYLEPHRINE HYDROCHLORIDE 200 MCG: 10 INJECTION INTRAVENOUS at 06:06

## 2017-06-01 RX ADMIN — METOPROLOL SUCCINATE 200 MG: 50 TABLET, EXTENDED RELEASE ORAL at 09:06

## 2017-06-01 RX ADMIN — SUCRALFATE 1 G: 1 SUSPENSION ORAL at 09:06

## 2017-06-01 RX ADMIN — LIDOCAINE HYDROCHLORIDE 75 MG: 20 INJECTION, SOLUTION INTRAVENOUS at 05:06

## 2017-06-01 RX ADMIN — PROPOFOL 150 MG: 10 INJECTION, EMULSION INTRAVENOUS at 05:06

## 2017-06-01 RX ADMIN — ONDANSETRON 4 MG: 2 INJECTION, SOLUTION INTRAMUSCULAR; INTRAVENOUS at 05:06

## 2017-06-01 RX ADMIN — CEFAZOLIN SODIUM 2 G: 2 SOLUTION INTRAVENOUS at 05:06

## 2017-06-01 RX ADMIN — HYDRALAZINE HYDROCHLORIDE 10 MG: 20 INJECTION INTRAMUSCULAR; INTRAVENOUS at 12:06

## 2017-06-01 RX ADMIN — MORPHINE SULFATE 4 MG: 4 INJECTION INTRAVENOUS at 02:06

## 2017-06-01 NOTE — PLAN OF CARE
Problem: Patient Care Overview  Goal: Plan of Care Review  Outcome: Ongoing (interventions implemented as appropriate)  Pt not in need of PRN duoneb treatment at this time. 96% sats on room air.

## 2017-06-01 NOTE — PROGRESS NOTES
06/01/17 1412   PRE-TX-O2-ETCO2   O2 Device (Oxygen Therapy) nasal cannula   $ Is the patient on Oxygen? Yes   Flow (L/min) 1   Oxygen Concentration (%) 24   SpO2 98 %   Pulse Oximetry Type Intermittent   $ Pulse Oximetry - Multiple Charge Pulse Oximetry - Multiple

## 2017-06-01 NOTE — PLAN OF CARE
Problem: Patient Care Overview  Goal: Plan of Care Review  POC reviewed with pt, pt verbalized understanding. Safety maintained throughout shift, bed locked and in lowest position, call light in reach, Side rails up X 3, bed alarm set. Pt remained in bed throughout shift. Pt remained free of fall/trauma. Pt self reports of pain treated with IV pain medication as ordered. Pt remained afebrile this shift. BP elevated this shift 1X dose of IV hydralazine 10mg used to control blood pressure. Pt remained NPO except for medications throughout shift. IVF  infused as ordered.Telemetry placed, Atrial fibrillation.  Pt prepared for surgery. Will continue to monitor.

## 2017-06-01 NOTE — PROGRESS NOTES
"Progress Note  Hospital Medicine  Patient Name:Nelly Landa  MRN:  [id number]  Patient Class: IP- Inpatient  Admit Date: 5/30/2017  Length of Stay: 2 days  Expected Discharge Date:   Attending Physician: Shirlene Rodriguez MD  Primary Care Provider:  Kyle Acuna MD    SUBJECTIVE:     Principal Problem: Pancreatitis, acute  Initial history of present illness: Miss Landa presents for evaluation of abdominal pain which has been present for 1-2 weeks. The pain is worse after she eats. The pain is throughout her abdomen. She denies known alleviating factors other than analgesics she has received here. She reports being evaluated at another facility and being told she would need a cholecystectomy. She was evaluated here in the ED and underwent CT abdomen which revealed findings suggestive of "groove pancreatitis"  and dilated CBD, possibly due to pancreaticoduodenal process. She denies alcohol consumption, trauma, new medication. She smokes cigarettes. She denies weight loss. She states her mother was diagnosed with either biliary or liver cancer. She reports history of PUD. She denies using NSAID recently. She denies fever.    PMH/PSH/SH/FH/Meds: reviewed.    Symptoms/Review of Systems: Anxiously waiting for GB surgery. No shortness of breath, cough, chest pain or headache, fever. Abdominal pain is stable.  Diet:  NPO   Activity level: Normal.    Pain:  As above    OBJECTIVE:   Vital Signs (Most Recent):      Temp: 98.2 °F (36.8 °C) (06/01/17 0815)  Pulse: 71 (06/01/17 0815)  Resp: 20 (06/01/17 0815)  BP: (!) 195/86 (06/01/17 0815)  SpO2: 97 % (06/01/17 0815)       Vital Signs Range (Last 24H):  Temp:  [97.9 °F (36.6 °C)-98.2 °F (36.8 °C)]   Pulse:  [68-73]   Resp:  [18-20]   BP: (140-195)/(65-88)   SpO2:  [96 %-99 %]     Weight: 70.3 kg (155 lb)  Body mass index is 30.27 kg/m².    Intake/Output Summary (Last 24 hours) at 06/01/17 1007  Last data filed at 05/31/17 1615   Gross per 24 hour   Intake          1684.17 ml "   Output                0 ml   Net          1684.17 ml     Physical Examination:  Constitutional: She is oriented to person, place, and time. She appears well-developed and well-nourished.   HENT:   Head: Normocephalic and atraumatic.   Eyes: Right eye exhibits no discharge. Left eye exhibits no discharge. No scleral icterus.   Neck: Neck supple. No JVD present.   Cardiovascular: Normal rate and regular rhythm.    Pulmonary/Chest: Effort normal. No respiratory distress.   Abdominal: Soft. Bowel sounds are normal. She exhibits no distension. There is no tenderness. There is no rebound and no guarding.   Musculoskeletal: She exhibits no edema or tenderness.   LEFT BKA   Neurological: She is alert and oriented to person, place, and time.   Skin: Skin is warm and dry.   Psychiatric: She has a normal mood and affect. Her behavior is normal.   Nursing note and vitals reviewed.    CBC:    Recent Labs  Lab 05/30/17  1500 05/31/17  0635 06/01/17  0518   WBC 20.00* 16.70* 13.30*   RBC 5.08 4.45 3.91*   HGB 11.6* 10.1* 9.0*   HCT 36.5* 31.9* 27.9*   * 462* 369*   MCV 72* 72* 71*   MCH 22.8* 22.8* 23.0*   MCHC 31.8* 31.8* 32.2   BMP    Recent Labs  Lab 05/30/17  1500 05/31/17  0635 06/01/17  0518   * 89 80    138 139   K 4.3 4.3 4.1    109 111*   CO2 23 20* 21*   BUN 30* 21 15   CREATININE 1.6* 1.2 1.4   CALCIUM 9.8 8.8 8.4*   MG  --  1.6 1.6      Diagnostic Results:  Microbiology Results (last 7 days)     ** No results found for the last 168 hours. **           HIDA:   1.  Lack of gallbladder visualization, suggestive of cystic duct obstruction/acute cholecystitis in the appropriate clinical setting.    2. The common bile duct appears dilated but there is bowel activity within one hour. Given recent CT findings, findings are suggestive of a partial distal common bile duct obstruction. Differential considerations include a choledocholithiasis and a partially obstructing mass.    CXR: Cardiomegaly and  mild central predominant interstitial pattern suggestive of mild interstitial edema/CHF. This pattern is similar compared to the 11/15/16 study.    CT abdomen:   1.  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  2.  Moderate dilatation of the common bile duct likely related to the pancreaticoduodenal process.  3.  Cholelithiasis.  4.  Left renal lesion probable cyst, however confirmation with renal ultrasound is recommended.    US Kidneys:  1.  2, simple left mid renal cysts are noted measuring up to 2.3 cm.  2. No acute abnormality is seen. There is increased renal cortical echogenicity bilaterally, consistent with chronic renal parenchymal disease.    US abdomen:  Cholelithiasis.  Mildly dilated common bile duct without a point of obstruction seen  Assessment/Plan:       * Pancreatitis, acute  Acute Cholecystitis     Patient is scheduled for lap kishore.  Patient to have EUS for evaluation of possible duodenal mass in 4-6 weeks.     COPD (chronic obstructive pulmonary disease)     PRN Duoneb  Discussed smoking cessation in detail.           Seizure disorder     Continue Lacosamide          Thrombocytosis     Reactive  Trend          Essential hypertension     Continue Metoprolol          REX (acute kidney injury)     Hold diuretic   Continue IVF hydration.         Chronic diastolic congestive heart failure     Continue Metoprolol  Hold diuretic for now  Monitor I/O, daily weight, especially while receiving IVF          Atrial fibrillation     Continue Metoprolol, Digoxin                   VTE Risk Mitigation         Ordered     heparin (porcine) injection 5,000 Units  Every 12 hours     Route:  Subcutaneous        05/30/17 2246     Medium Risk of VTE  Once      05/30/17 2246        Shirlene Rodriguez MD  Department of Hospital Medicine   [facility]

## 2017-06-01 NOTE — PLAN OF CARE
"Problem: Patient Care Overview  Goal: Plan of Care Review  Hourly rounding utilized. Pt. Able to verbalize needs. POC discussed with pt. At beginning of shift. Pt. Verbalized understanding to same. Pt. Still hesitant with regards to cholecystectomy. "I don't know how my body will respond because Im so old, and I know it will hurt more than my other surgeries." Increased pain noted with eating, pt. Complaints 9/10. Moderately controlled with IV Morphine, which pt. Is requesting consistently every 4hrs. Denies N/V. NPO after Midnight.  IV fluids given as ordered. Brief changed at pt. Request. Remains free form injury. Call light in reach. Bed low and locked. SRx2.       "

## 2017-06-01 NOTE — ANESTHESIA PREPROCEDURE EVALUATION
06/01/2017  Nelly Landa is a 66 y.o., female.    Anesthesia Evaluation    I have reviewed the Patient Summary Reports.    I have reviewed the Nursing Notes.   I have reviewed the Medications.     Review of Systems  Social:  Smoker 50 pk yrs   Cardiovascular:   Hypertension Past MI CAD  Dysrhythmias atrial fibrillation CHF hyperlipidemia ECG has been reviewed. +pacemaker    Ekg:afib, on digoxin    11/16 echo:  CONCLUSIONS     1 - Normal left ventricular systolic function (EF 55-60%).     2 - Mildly depressed right ventricular systolic function .     3 - Left ventricular diastolic dysfunction.     4 - Severe left atrial enlargement.     5 - Increased central venous pressure.    Pulmonary:   COPD    Renal/:   Chronic Renal Disease, CRI    Musculoskeletal:   Arthritis   Spine Disorders:    Neurological:   CVA Seizures    Endocrine:   Diabetes        Physical Exam  General:  Obesity    Airway/Jaw/Neck:  Airway Findings: Mouth Opening: Normal Mallampati: III  TM Distance: 4 - 6 cm  Jaw/Neck Findings:  Neck ROM: Extension Decreased, Mild      Dental:  Dental Findings: Periodontal disease, SevereVery few remaining teeth, loose and friable   Chest/Lungs:  Chest/Lungs Findings: Normal Respiratory Rate, Clear to auscultation     Heart/Vascular:  Heart Findings: Rate: Normal  Rhythm: Irregularly Irregular        Mental Status:  Mental Status Findings:  Cooperative, Alert and Oriented         Anesthesia Plan  Type of Anesthesia, risks & benefits discussed:  Anesthesia Type:  general  Patient's Preference:   Intra-op Monitoring Plan: standard ASA monitors  Intra-op Monitoring Plan Comments:   Post Op Pain Control Plan:   Post Op Pain Control Plan Comments:   Induction:   IV  Beta Blocker:  Patient is on a Beta-Blocker and has received one dose within the past 24 hours (No further documentation required).        Informed Consent: Patient understands risks and agrees with Anesthesia plan.  Questions answered. Anesthesia consent signed with patient.  ASA Score: 3     Day of Surgery Review of History & Physical: I have interviewed and examined the patient. I have reviewed the patient's H&P dated:  There are no significant changes.          Ready For Surgery From Anesthesia Perspective.

## 2017-06-02 ENCOUNTER — ANESTHESIA (OUTPATIENT)
Dept: SURGERY | Facility: HOSPITAL | Age: 67
DRG: 418 | End: 2017-06-02
Payer: MEDICARE

## 2017-06-02 LAB
ALBUMIN SERPL BCP-MCNC: 2.5 G/DL
ALBUMIN SERPL BCP-MCNC: 2.5 G/DL
ALP SERPL-CCNC: 119 U/L
ALP SERPL-CCNC: 119 U/L
ALT SERPL W/O P-5'-P-CCNC: 14 U/L
ALT SERPL W/O P-5'-P-CCNC: 14 U/L
ANION GAP SERPL CALC-SCNC: 10 MMOL/L
ANION GAP SERPL CALC-SCNC: 10 MMOL/L
AST SERPL-CCNC: 20 U/L
AST SERPL-CCNC: 20 U/L
BASOPHILS # BLD AUTO: 0 K/UL
BASOPHILS # BLD AUTO: 0 K/UL
BASOPHILS NFR BLD: 0.2 %
BASOPHILS NFR BLD: 0.2 %
BILIRUB SERPL-MCNC: 0.4 MG/DL
BILIRUB SERPL-MCNC: 0.4 MG/DL
BUN SERPL-MCNC: 15 MG/DL
BUN SERPL-MCNC: 15 MG/DL
CALCIUM SERPL-MCNC: 9 MG/DL
CALCIUM SERPL-MCNC: 9 MG/DL
CHLORIDE SERPL-SCNC: 107 MMOL/L
CHLORIDE SERPL-SCNC: 107 MMOL/L
CO2 SERPL-SCNC: 20 MMOL/L
CO2 SERPL-SCNC: 20 MMOL/L
CREAT SERPL-MCNC: 1.3 MG/DL
CREAT SERPL-MCNC: 1.3 MG/DL
DIFFERENTIAL METHOD: ABNORMAL
DIFFERENTIAL METHOD: ABNORMAL
EOSINOPHIL # BLD AUTO: 0 K/UL
EOSINOPHIL # BLD AUTO: 0 K/UL
EOSINOPHIL NFR BLD: 0 %
EOSINOPHIL NFR BLD: 0 %
ERYTHROCYTE [DISTWIDTH] IN BLOOD BY AUTOMATED COUNT: 22.9 %
ERYTHROCYTE [DISTWIDTH] IN BLOOD BY AUTOMATED COUNT: 22.9 %
EST. GFR  (AFRICAN AMERICAN): 49 ML/MIN/1.73 M^2
EST. GFR  (AFRICAN AMERICAN): 49 ML/MIN/1.73 M^2
EST. GFR  (NON AFRICAN AMERICAN): 43 ML/MIN/1.73 M^2
EST. GFR  (NON AFRICAN AMERICAN): 43 ML/MIN/1.73 M^2
GLUCOSE SERPL-MCNC: 84 MG/DL
GLUCOSE SERPL-MCNC: 84 MG/DL
HCT VFR BLD AUTO: 28 %
HCT VFR BLD AUTO: 28 %
HGB BLD-MCNC: 9.1 G/DL
HGB BLD-MCNC: 9.1 G/DL
LYMPHOCYTES # BLD AUTO: 0.9 K/UL
LYMPHOCYTES # BLD AUTO: 0.9 K/UL
LYMPHOCYTES NFR BLD: 6.4 %
LYMPHOCYTES NFR BLD: 6.4 %
MCH RBC QN AUTO: 23.4 PG
MCH RBC QN AUTO: 23.4 PG
MCHC RBC AUTO-ENTMCNC: 32.3 %
MCHC RBC AUTO-ENTMCNC: 32.3 %
MCV RBC AUTO: 72 FL
MCV RBC AUTO: 72 FL
MONOCYTES # BLD AUTO: 0.8 K/UL
MONOCYTES # BLD AUTO: 0.8 K/UL
MONOCYTES NFR BLD: 6.1 %
MONOCYTES NFR BLD: 6.1 %
NEUTROPHILS # BLD AUTO: 12 K/UL
NEUTROPHILS # BLD AUTO: 12 K/UL
NEUTROPHILS NFR BLD: 87.3 %
NEUTROPHILS NFR BLD: 87.3 %
PLATELET # BLD AUTO: 374 K/UL
PLATELET # BLD AUTO: 374 K/UL
PMV BLD AUTO: 8 FL
PMV BLD AUTO: 8 FL
POTASSIUM SERPL-SCNC: 5.2 MMOL/L
POTASSIUM SERPL-SCNC: 5.2 MMOL/L
PROT SERPL-MCNC: 6.6 G/DL
PROT SERPL-MCNC: 6.6 G/DL
RBC # BLD AUTO: 3.88 M/UL
RBC # BLD AUTO: 3.88 M/UL
SODIUM SERPL-SCNC: 137 MMOL/L
SODIUM SERPL-SCNC: 137 MMOL/L
WBC # BLD AUTO: 13.7 K/UL
WBC # BLD AUTO: 13.7 K/UL

## 2017-06-02 PROCEDURE — 36415 COLL VENOUS BLD VENIPUNCTURE: CPT

## 2017-06-02 PROCEDURE — 81000 URINALYSIS NONAUTO W/SCOPE: CPT

## 2017-06-02 PROCEDURE — 85025 COMPLETE CBC W/AUTO DIFF WBC: CPT

## 2017-06-02 PROCEDURE — 25000003 PHARM REV CODE 250: Performed by: PHYSICIAN ASSISTANT

## 2017-06-02 PROCEDURE — 84484 ASSAY OF TROPONIN QUANT: CPT

## 2017-06-02 PROCEDURE — 99239 HOSP IP/OBS DSCHRG MGMT >30: CPT | Mod: ,,, | Performed by: INTERNAL MEDICINE

## 2017-06-02 PROCEDURE — 80053 COMPREHEN METABOLIC PANEL: CPT

## 2017-06-02 PROCEDURE — 25000003 PHARM REV CODE 250: Performed by: INTERNAL MEDICINE

## 2017-06-02 PROCEDURE — 25000003 PHARM REV CODE 250: Performed by: EMERGENCY MEDICINE

## 2017-06-02 PROCEDURE — 63600175 PHARM REV CODE 636 W HCPCS: Performed by: PHYSICIAN ASSISTANT

## 2017-06-02 PROCEDURE — 12000002 HC ACUTE/MED SURGE SEMI-PRIVATE ROOM

## 2017-06-02 PROCEDURE — 99900035 HC TECH TIME PER 15 MIN (STAT)

## 2017-06-02 PROCEDURE — 87040 BLOOD CULTURE FOR BACTERIA: CPT | Mod: 59

## 2017-06-02 RX ORDER — ALPRAZOLAM 0.25 MG/1
0.5 TABLET ORAL EVERY 8 HOURS PRN
Status: DISCONTINUED | OUTPATIENT
Start: 2017-06-02 | End: 2017-06-02

## 2017-06-02 RX ORDER — HYDROCODONE BITARTRATE AND ACETAMINOPHEN 7.5; 325 MG/1; MG/1
1 TABLET ORAL EVERY 6 HOURS PRN
Qty: 14 TABLET | Refills: 0 | Status: SHIPPED | OUTPATIENT
Start: 2017-06-02

## 2017-06-02 RX ORDER — DIGOXIN 250 MCG
250 TABLET ORAL DAILY
Start: 2017-06-02 | End: 2017-06-02 | Stop reason: HOSPADM

## 2017-06-02 RX ADMIN — PANTOPRAZOLE SODIUM 40 MG: 40 TABLET, DELAYED RELEASE ORAL at 10:06

## 2017-06-02 RX ADMIN — METOPROLOL SUCCINATE 200 MG: 50 TABLET, EXTENDED RELEASE ORAL at 10:06

## 2017-06-02 RX ADMIN — BACLOFEN 10 MG: 10 TABLET ORAL at 10:06

## 2017-06-02 RX ADMIN — NICOTINE 1 PATCH: 21 PATCH, EXTENDED RELEASE TRANSDERMAL at 10:06

## 2017-06-02 RX ADMIN — OXYCODONE HYDROCHLORIDE 5 MG: 5 TABLET ORAL at 09:06

## 2017-06-02 RX ADMIN — MORPHINE SULFATE 4 MG: 4 INJECTION INTRAVENOUS at 10:06

## 2017-06-02 RX ADMIN — MORPHINE SULFATE 4 MG: 4 INJECTION INTRAVENOUS at 05:06

## 2017-06-02 RX ADMIN — LACOSAMIDE 150 MG: 10 SOLUTION ORAL at 01:06

## 2017-06-02 RX ADMIN — DIGOXIN 250 MCG: 0.12 TABLET ORAL at 10:06

## 2017-06-02 RX ADMIN — FLUOXETINE 20 MG: 20 CAPSULE ORAL at 10:06

## 2017-06-02 RX ADMIN — HEPARIN SODIUM 5000 UNITS: 5000 INJECTION, SOLUTION INTRAVENOUS; SUBCUTANEOUS at 09:06

## 2017-06-02 RX ADMIN — SUCRALFATE 1 G: 1 SUSPENSION ORAL at 12:06

## 2017-06-02 RX ADMIN — SODIUM POLYSTYRENE SULFONATE 15 G: 15 SUSPENSION ORAL; RECTAL at 09:06

## 2017-06-02 RX ADMIN — HEPARIN SODIUM 5000 UNITS: 5000 INJECTION, SOLUTION INTRAVENOUS; SUBCUTANEOUS at 10:06

## 2017-06-02 RX ADMIN — SUCRALFATE 1 G: 1 SUSPENSION ORAL at 09:06

## 2017-06-02 RX ADMIN — OXYCODONE HYDROCHLORIDE 5 MG: 5 TABLET ORAL at 03:06

## 2017-06-02 RX ADMIN — ATORVASTATIN CALCIUM 40 MG: 40 TABLET, FILM COATED ORAL at 09:06

## 2017-06-02 RX ADMIN — STANDARDIZED SENNA CONCENTRATE AND DOCUSATE SODIUM 1 TABLET: 8.6; 5 TABLET, FILM COATED ORAL at 09:06

## 2017-06-02 RX ADMIN — SUCRALFATE 1 G: 1 SUSPENSION ORAL at 05:06

## 2017-06-02 RX ADMIN — LACOSAMIDE 150 MG: 10 SOLUTION ORAL at 10:06

## 2017-06-02 RX ADMIN — BACLOFEN 10 MG: 10 TABLET ORAL at 09:06

## 2017-06-02 NOTE — PLAN OF CARE
Spoke with Yesenia Hardin, made her aware of pt. Increased Agitation. Pt. Is banging on side of bed repeatedly. Screaming that she wants to go home. Yesenia is going to speak with Dr. Rodriguez to see if we can get something more fast acting. Will cont. To monitor.

## 2017-06-02 NOTE — ANESTHESIA POSTPROCEDURE EVALUATION
Anesthesia Post Evaluation    Patient: Nelly Landa    Procedure(s) Performed: Procedure(s) (LRB):  CHOLECYSTECTOMY-LAPAROSCOPIC (N/A)    Final Anesthesia Type: general  Patient location during evaluation: PACU  Patient participation: Yes- Able to Participate  Level of consciousness: awake and alert and oriented  Post-procedure vital signs: reviewed and stable  Pain management: adequate  Airway patency: patent  PONV status at discharge: No PONV  Anesthetic complications: no      Cardiovascular status: hemodynamically stable  Respiratory status: unassisted, spontaneous ventilation and room air  Hydration status: euvolemic  Follow-up not needed.        Visit Vitals  BP (!) 140/65   Pulse 66   Temp 36.8 °C (98.2 °F) (Temporal)   Resp 19   Ht 5' (1.524 m)   Wt 70.3 kg (155 lb)   LMP  (LMP Unknown)   SpO2 96%   Breastfeeding? No   BMI 30.27 kg/m²       Pain/Javy Score: Pain Assessment Performed: Yes (6/1/2017  8:40 PM)  Presence of Pain: denies (6/1/2017  8:40 PM)  Pain Rating Prior to Med Admin: 7 (6/1/2017  2:41 PM)  Pain Rating Post Med Admin: 3 (6/1/2017  3:11 PM)  Javy Score: 10 (6/1/2017  8:40 PM)

## 2017-06-02 NOTE — TRANSFER OF CARE
Anesthesia Transfer of Care Note    Patient: Nelly Landa    Procedure(s) Performed: Procedure(s) (LRB):  CHOLECYSTECTOMY-LAPAROSCOPIC (N/A)    Patient location: PACU    Transport from OR: Transported from OR on 2-3 L/min O2 by NC with adequate spontaneous ventilation    Post pain: adequate analgesia    Post assessment: no apparent anesthetic complications    Post vital signs: stable    Level of consciousness: awake    Nausea/Vomiting: no nausea/vomiting    Complications: none    Transfer of care protocol was followed      Last vitals:   Visit Vitals  BP (!) 181/88   Pulse 72   Temp 37.7 °C (99.8 °F)   Resp 20   Ht 5' (1.524 m)   Wt 70.3 kg (155 lb)   LMP  (LMP Unknown)   SpO2 97%   Breastfeeding? No   BMI 30.27 kg/m²

## 2017-06-02 NOTE — PLAN OF CARE
Problem: Patient Care Overview  Goal: Plan of Care Review  Hourly rounding utilized. Pt. Able to verbalize needs. POC discussed with pt. And her son after returning from surgery. Both verbalized understanding.  Pain well controlled with IV Morphine. Denies N/V. Tolerating clear liquids.  IV fluids given as ordered. Brief changed at pt. Request.  Dressings to lap sites CDI. CHRISS drain to bulb suction emptied PRN and compressed. est. Remains free form injury. Call light in reach. Bed low and locked. SRx2

## 2017-06-02 NOTE — PLAN OF CARE
Informed Dr Rodriguez of potassium 5.2.  Orders for kayexalate once and ok to discharge after receiving.

## 2017-06-02 NOTE — PROGRESS NOTES
Contacted  regarding pt's crying and screaming at bedside, new orders given for Xanax 0.5mg q8hrs, PRN.

## 2017-06-02 NOTE — PROGRESS NOTES
"Approximately 0800, Zora HE attempted to call patient's daughter in law Melissa @ [phone]. No answer and voice msg left with callback # provided.    8:29 AM  Patient offered PO xanax. Patient refused at this time, reporting that drug taken in the past @ [facility] with adverse effect: "I started hollering out and yelling 'come help me," "I'm sorry," and "please help." States no one could offer explanation of why this occurred. States "I'm scared to take it."    D/w patient POC but patient still adamant that she leave today, stating, "I want to go home. I want to go home. Why can't I go home." Explained to patient that MD must see her and patient reminded that she just had surgery yesterday.     Patient appears more calm now, able to have conversation about plan of care with decrease in level of agitation noted.     8:34 AM  No answer yet from daughter in law.     Awaiting bed availability on 3rd floor for transfer.   "

## 2017-06-02 NOTE — PROGRESS NOTES
"[facility] Surgery  Progress Note    Subjective:     History of Present Illness:  Miss Landa presents for evaluation of abdominal pain which has been present for 1-2 weeks. The pain is worse after she eats. The pain is throughout her abdomen. She denies known alleviating factors other than analgesics she has received here. She reports being evaluated at [facility] for 11 days and being told she would need a cholecystectomy.  When asked why they didn't do it at [facility] she said the surgeon wasn't sure her pain was coming from the gallbladder.  She describes diffuse abdominal pain which varies in intensity but is constant. She has not noticed change in intensity of pain with eating and there are no alleviating factors. Pt also reports subjective fever and nausea. Denies emesis.   She was evaluated here in the ED and underwent CT abdomen which revealed findings suggestive of "groove pancreatitis"  and dilated CBD, possibly due to pancreaticoduodenal process. She denies alcohol consumption, trauma, new medication. She smokes cigarettes. She denies weight loss. She states her mother was diagnosed with either biliary or liver cancer. She reports history of PUD. She denies using NSAID recently. She denies fever.    Post-Op Info:  Procedure(s) (LRB):  CHOLECYSTECTOMY-LAPAROSCOPIC (N/A)   1 Day Post-Op     Interval History: POD 1, doing well.    Medications:  Continuous Infusions:   lactated ringers 50 mL/hr at 06/01/17 1225     Scheduled Meds:   atorvastatin  40 mg Oral QHS    baclofen  10 mg Oral BID    digoxin  250 mcg Oral Daily    fluoxetine  20 mg Oral Daily    heparin (porcine)  5,000 Units Subcutaneous Q12H    lacosamide  150 mg Oral BID    metoprolol succinate  200 mg Oral Daily    nicotine  1 patch Transdermal Daily    pantoprazole  40 mg Oral Daily    sucralfate  1 g Oral QID (AC & HS)     PRN Meds:acetaminophen, albuterol-ipratropium 2.5mg-0.5mg/3mL, alprazolam, hydrALAZINE, " magnesium oxide, magnesium oxide, morphine, ondansetron, oxycodone, potassium chloride 10%, potassium chloride 10%, prochlorperazine, senna-docusate 8.6-50 mg, sodium chloride 0.9%     Review of patient's allergies indicates:  No Known Allergies  Objective:     Vital Signs (Most Recent):  Temp: 98.1 °F (36.7 °C) (06/02/17 1153)  Pulse: 73 (06/02/17 1153)  Resp: 18 (06/02/17 1153)  BP: (!) 144/70 (06/02/17 1153)  SpO2: 96 % (06/02/17 1153) Vital Signs (24h Range):  Temp:  [98 °F (36.7 °C)-99.8 °F (37.7 °C)] 98.1 °F (36.7 °C)  Pulse:  [62-79] 73  Resp:  [14-20] 18  SpO2:  [92 %-98 %] 96 %  BP: (112-186)/(62-91) 144/70     Weight: 70.3 kg (155 lb)  Body mass index is 30.27 kg/m².    Intake/Output - Last 3 Shifts       05/31 0700 - 06/01 0659 06/01 0700 - 06/02 0659 06/02 0700 - 06/03 0659    P.O. 120 300     I.V. (mL/kg) 1564.2 (22.2) 1714.2 (24.4)     Total Intake(mL/kg) 1684.2 (24) 2014.2 (28.7)     Urine (mL/kg/hr)  0 (0)     Emesis/NG output       Drains  40 (0)     Stool  0 (0)     Blood  10 (0)     Total Output   50      Net +1684.2 +1964.2             Urine Occurrence 3 x 9 x     Stool Occurrence 0 x 0 x           Physical Exam   Constitutional: She is oriented to person, place, and time. She appears well-developed and well-nourished. No distress.   HENT:   Head: Normocephalic and atraumatic.   Right Ear: External ear normal.   Left Ear: External ear normal.   Eyes: Conjunctivae are normal. Pupils are equal, round, and reactive to light. Right eye exhibits no discharge. Left eye exhibits no discharge.   Neck: No tracheal deviation present. No thyromegaly present.   Cardiovascular: Normal rate and regular rhythm.    Pulmonary/Chest: Effort normal. No respiratory distress.   Abdominal: Soft. She exhibits no distension. There is no guarding.   Musculoskeletal: She exhibits no edema or tenderness.   Lymphadenopathy:     She has no cervical adenopathy.   Neurological: She is alert and oriented to person, place, and  time. No cranial nerve deficit.   Skin: Skin is warm and dry. No rash noted. She is not diaphoretic. No pallor.   Psychiatric: She has a normal mood and affect. Her behavior is normal. Judgment and thought content normal.       Significant Labs:  CBC:   Recent Labs  Lab 06/01/17 0518   WBC 13.30*   RBC 3.91*   HGB 9.0*   HCT 27.9*   *   MCV 71*   MCH 23.0*   MCHC 32.2     CMP:   Recent Labs  Lab 06/01/17 0518   GLU 80   CALCIUM 8.4*   ALBUMIN 2.2*   PROT 5.6*      K 4.1   CO2 21*   *   BUN 15   CREATININE 1.4   ALKPHOS 139*   ALT 15   AST 19   BILITOT 0.2       Significant Diagnostics:  I have reviewed all pertinent imaging results/findings within the past 24 hours.    Assessment/Plan:     Calculus of gallbladder with chronic cholecystitis without obstruction    POD 1  Doing well  CHRISS drainage clear.  Await lab  If lab OK, can discharge with CHRISS in place.  Will remove in office.         * Pancreatitis, acute    At this time, lipase is normal.  Awaiting records from [facility] and GI consult.  She states she had an EGD at [facility] that showed bleeding ulcers.  Recommend elective cholecystectomy when cleared by GI.            Syed Marquez MD  General Surgery  [facility]

## 2017-06-02 NOTE — PLAN OF CARE
06/02/17 0520   Patient Assessment/Suction   Level of Consciousness (AVPU) alert   Respiratory Effort Normal;Unlabored   Expansion/Accessory Muscles/Retractions no use of accessory muscles   PRE-TX-O2-ETCO2   O2 Device (Oxygen Therapy) nasal cannula   Flow (L/min) 2   SpO2 96 %   Pulse 72   Aerosol Therapy   $ Aerosol Therapy Charges PRN treatment not required   Respiratory Treatment Status PRN treatment not required   Pt tolerates NC well. No PRN treatment required at this time.

## 2017-06-02 NOTE — ASSESSMENT & PLAN NOTE
POD 1  Doing well  CHRISS drainage clear.  Await lab  If lab OK, can discharge with CHRISS in place.  Will remove in office.

## 2017-06-02 NOTE — PROGRESS NOTES
"SSC met with patient at bedside regarding resumption of home health.  Patient refuse to sign patient choice disclosure stating "I did that already. I have home health already."  Ashley, SSC  "

## 2017-06-02 NOTE — BRIEF OP NOTE
[facility]  Brief Operative Note    SUMMARY     Surgery Date: 6/1/2017     Surgeon(s) and Role:     * Syed Marquez MD - Primary    Assisting Surgeon: None    Pre-op Diagnosis:  Calculus of gallbladder with chronic cholecystitis without obstruction [K80.10]    Post-op Diagnosis:  Post-Op Diagnosis Codes:     * Calculus of gallbladder with chronic cholecystitis without obstruction [K80.10]    Procedure(s) (LRB):  CHOLECYSTECTOMY-LAPAROSCOPIC (N/A)    Anesthesia: General    Description of the findings of the procedure: Fibrotic, shrunken gallbladder with stones and sludge.  Marked inflammation    Estimated Blood Loss: 10 mL         Specimens:   Specimen (12h ago through future)    Start     Ordered    06/01/17 1733  Specimen to Pathology - Surgery  Once     Comments:  Pre-op Diagnosis: Calculus of gallbladder with chronic cholecystitis without obstruction [K80.10]Post-op Diagnosis:SAMEProcedure(s):CHOLECYSTECTOMY-LAPAROSCOPIC Number of specimens: 1Name of specimens: GALLBLADDER      06/01/17 1733

## 2017-06-02 NOTE — PLAN OF CARE
Pt awake and alert, vs stable, no complaints of pain, new IV started per Yumiko RN due to old one infiltrating, IV fluids infused, tolerated clear liquids, lap sites x 3 dressings to abdomen cdi, drain intact to R abdomen. Pt transported via bed from PACU back to room 404. Call light within reach. Report given to NERI Sullivan.

## 2017-06-02 NOTE — PLAN OF CARE
"Received patient from fourth floor today at 1000, received report from Yesenia on 4th.  Patient was very agitated and aggressive due to unrelieved pain relief.  Morphine given through IV and her pain dropped to a 5.  Patient increasingly became confused throughout the day stating "I want a hamburger, it's 265."  Dr. Rodriguez notified.  Safety maintained, will continue to monitor.    "

## 2017-06-02 NOTE — SUBJECTIVE & OBJECTIVE
Interval History: POD 1, doing well.    Medications:  Continuous Infusions:   lactated ringers 50 mL/hr at 06/01/17 1225     Scheduled Meds:   atorvastatin  40 mg Oral QHS    baclofen  10 mg Oral BID    digoxin  250 mcg Oral Daily    fluoxetine  20 mg Oral Daily    heparin (porcine)  5,000 Units Subcutaneous Q12H    lacosamide  150 mg Oral BID    metoprolol succinate  200 mg Oral Daily    nicotine  1 patch Transdermal Daily    pantoprazole  40 mg Oral Daily    sucralfate  1 g Oral QID (AC & HS)     PRN Meds:acetaminophen, albuterol-ipratropium 2.5mg-0.5mg/3mL, alprazolam, hydrALAZINE, magnesium oxide, magnesium oxide, morphine, ondansetron, oxycodone, potassium chloride 10%, potassium chloride 10%, prochlorperazine, senna-docusate 8.6-50 mg, sodium chloride 0.9%     Review of patient's allergies indicates:  No Known Allergies  Objective:     Vital Signs (Most Recent):  Temp: 98.1 °F (36.7 °C) (06/02/17 1153)  Pulse: 73 (06/02/17 1153)  Resp: 18 (06/02/17 1153)  BP: (!) 144/70 (06/02/17 1153)  SpO2: 96 % (06/02/17 1153) Vital Signs (24h Range):  Temp:  [98 °F (36.7 °C)-99.8 °F (37.7 °C)] 98.1 °F (36.7 °C)  Pulse:  [62-79] 73  Resp:  [14-20] 18  SpO2:  [92 %-98 %] 96 %  BP: (112-186)/(62-91) 144/70     Weight: 70.3 kg (155 lb)  Body mass index is 30.27 kg/m².    Intake/Output - Last 3 Shifts       05/31 0700 - 06/01 0659 06/01 0700 - 06/02 0659 06/02 0700 - 06/03 0659    P.O. 120 300     I.V. (mL/kg) 1564.2 (22.2) 1714.2 (24.4)     Total Intake(mL/kg) 1684.2 (24) 2014.2 (28.7)     Urine (mL/kg/hr)  0 (0)     Emesis/NG output       Drains  40 (0)     Stool  0 (0)     Blood  10 (0)     Total Output   50      Net +1684.2 +1964.2             Urine Occurrence 3 x 9 x     Stool Occurrence 0 x 0 x           Physical Exam   Constitutional: She is oriented to person, place, and time. She appears well-developed and well-nourished. No distress.   HENT:   Head: Normocephalic and atraumatic.   Right Ear: External ear  normal.   Left Ear: External ear normal.   Eyes: Conjunctivae are normal. Pupils are equal, round, and reactive to light. Right eye exhibits no discharge. Left eye exhibits no discharge.   Neck: No tracheal deviation present. No thyromegaly present.   Cardiovascular: Normal rate and regular rhythm.    Pulmonary/Chest: Effort normal. No respiratory distress.   Abdominal: Soft. She exhibits no distension. There is no guarding.   Musculoskeletal: She exhibits no edema or tenderness.   Lymphadenopathy:     She has no cervical adenopathy.   Neurological: She is alert and oriented to person, place, and time. No cranial nerve deficit.   Skin: Skin is warm and dry. No rash noted. She is not diaphoretic. No pallor.   Psychiatric: She has a normal mood and affect. Her behavior is normal. Judgment and thought content normal.       Significant Labs:  CBC:   Recent Labs  Lab 06/01/17 0518   WBC 13.30*   RBC 3.91*   HGB 9.0*   HCT 27.9*   *   MCV 71*   MCH 23.0*   MCHC 32.2     CMP:   Recent Labs  Lab 06/01/17 0518   GLU 80   CALCIUM 8.4*   ALBUMIN 2.2*   PROT 5.6*      K 4.1   CO2 21*   *   BUN 15   CREATININE 1.4   ALKPHOS 139*   ALT 15   AST 19   BILITOT 0.2       Significant Diagnostics:  I have reviewed all pertinent imaging results/findings within the past 24 hours.

## 2017-06-02 NOTE — PLAN OF CARE
"Pt. Brief and linen changed approximately 10 min ago. Pt. Now agitated stating "I am soaking wet." Brief checked, its dry. Resp Therapist,Toma Piedra, also at bedside. She also checked brief, as pt. Is adament that she is soaking wet. Tried to redirect, unsuccessful. Pt. Cont. To be sure that she is wet and is calling out for someone to help her and to take her home.     Sarita Cullen NP notified at this time for poss. Med for agitation/anxiety. Per Sarita, will hold off med for now, due to possiblility of increased sedation. Will make day shift aware.  Will cont. To monitor.    Will continue to provide reassurance to pt. And will try and redirect.   "

## 2017-06-02 NOTE — OP NOTE
DATE OF PROCEDURE:  06/01/2017    PREOPERATIVE DIAGNOSES:  Acute cholecystitis with cholelithiasis and gallstone   pancreatitis.    POSTOPERATIVE DIAGNOSES:  Acute cholecystitis with cholelithiasis and gallstone   pancreatitis.    PROCEDURE:  Laparoscopic cholecystectomy.    SURGEON:  Syed Marquez M.D.    ANESTHESIA:  General endotracheal.    PROCEDURE AND FINDINGS:  With the patient in supine position under satisfactory   general endotracheal anesthesia, the abdomen was prepped and draped in the usual   manner for surgery.  An infraumbilical skin incision was made and the Veress   needle inserted in the abdomen in the standard manner.  The abdomen was   insufflated to 12 mmHg pressures with carbon dioxide and the Veress needle   replaced with a laparoscopic trocar.  The laparoscope was introduced and it was   noted that the trocar gone into omentum, which was adherent to the anterior   abdominal wall and a window could not be readily seen.  For this reason,   additional trocars were placed in the insufflated abdomen and the right upper   quadrant at the mid clavicular line and the 5 mm laparoscope was then placed in   this port.  This enabled the medial readily visualized the umbilical area where   omentum was noted to be adherent to the anterior abdominal wall, but no evidence   of any bowel involvement or bowel injury.  Additional trocars were placed   subxiphoid and right subcostal under direct vision and using dissecting forceps   and jorge a, the omentum was taken down from around the umbilical port.  The   laparoscope was then placed back in the umbilical port and attention was turned   toward performing cholecystectomy.  The gallbladder was markedly fibrotic and   shrunken.  It was grasped at the fundus and the neck and the cystic structures   were then dissected free.  There was noted to be omentum over the gallbladder.    This was taken down with sharp and blunt dissection, exposing the neck of the    gallbladder itself.  The duodenum was taken down off the gallbladder with filmy   adhesions.  Following this, attention was turned toward identifying the neck of   the gallbladder as well as the cystic structures.  The serosa investing the   gallbladder was markedly thickened and then inflamed.  This was scarred down and   dissection in order to expose the cystic structures took over an hour of   dissection.  The cystic artery was then finally identified, doubly clipped and   divided.  The cystic duct was identified and clipped.  The gallbladder was then   removed from the neck to the fundus using electrocautery dissection.  In the   process of dissecting out the gallbladder, it was noted that there was some bile   leaking from the accessory duct and the liver.  This required it to be oversewn   using 4-0 Vicryl suture.  This was placed with good closure of the leak.  The   gallbladder was then completely dissected free from the bed.  Hemostasis was   maintained with the electrocautery.  The gallbladder was placed in an Endopouch   bag.  The entire area was then copiously irrigated with liter of saline and   aspirated.  Again, the liver bed was examined and there was no evidence of any   bile leak.  It was elected to place a 19-Syriac channel drain in the gallbladder   fossa prophylactically and to drain would have a fluid collection that would   be.  This was brought out through the lateral trocar site and secured into   position.  The abdomen was then finally aspirated and the gallbladder was   removed through the umbilical port.  The abdomen was desufflated.  All trocars   were removed.  The fascia was closed with figure-of-eight 0 Vicryl and the skin   was closed with 4-0 Monocryl subcuticular sutures.  The wounds were washed and   dried.  Steri-Strips and sterile dressings were applied.  The patient tolerated   the procedure well and was sent to Recovery in stable condition.    ESTIMATED BLOOD LOSS:  20  mL.    COMPLICATIONS:  Marked prolonged dissection and operative time secondary to the   marked scarring and inflammation of the gallbladder justifying -22 modifier    SPECIMEN:  Gallbladder.    DRAINS:  Narendra-Campos x1.      CARMENCITA/  dd: 06/02/2017 12:58:27 (CDT)  td: 06/02/2017 14:36:14 (CDT)  Doc ID   #[id number]  Job ID #[id number]    CC:

## 2017-06-02 NOTE — PROGRESS NOTES
7:39 AM  Dr Marquez notified that patient's bloodwork not drawn this morning d/t severe agitation.    Per night shift primary nurse NERI Sullivan, awaiting call back from Dr Rodriguez.    Also awaiting at this time, notification from 3rd floor charge Christine HE that OK to transfer patient to [facility] for closer monitoring.

## 2017-06-02 NOTE — PLAN OF CARE
Spoke with Dr Korina Boone's nurse regarding the pt's follow up appointment; she will contact the pt directly with an appointment for her EUS....NERI Del Valle CM

## 2017-06-02 NOTE — PHYSICIAN QUERY
PT Name: Nelly Landa  MR #: [id number]     Physician Query Form - Diagnosis Clarification      CDS/: Cortney Hamilton               Contact information:[email or web address]    This form is a permanent document in the medical record.     Query Date: June 2, 2017    By submitting this query, we are merely seeking further clarification of documentation.  Please utilize your independent clinical judgment when addressing the question(s) below.     The medical record contains the following:      Findings Supporting Clinical Information Location in Medical Record   Acute Pancreatitis Pancreatitis, acute        NPO   IVF   PRN analgesics   Consult GE for possible EGD to evaluate duodenum   Consult surgeon for possible cholecystectomy     Patient's lipase is normal and pancreatitis is less likely.       HIDA:   1.  Lack of gallbladder visualization, suggestive of cystic duct obstruction/acute cholecystitis in the appropriate clinical setting.    Pancreatitis, acute   Acute Cholecystitis     Patient is scheduled for lap kishore.   Patient to have EUS for evaluation of possible duodenal mass in 4-6 weeks           H&P  HM Progress note 6-1                ED MD 5-30          HM Progress note 6-1     Please clarify if theAcute Pancreatitis diagnosis has been:    [  ] Ruled In  [x  ] Ruled Out  [  ] Other/Clarification of findings (please specify)_______________________________    Please document in your progress notes daily for the duration of treatment, until resolved, and include in your discharge summary.

## 2017-06-02 NOTE — DISCHARGE SUMMARY
"Discharge Summary  Hospital Medicine    Admit Date: 5/30/2017    Date and Time: 6/2/20174:19 PM    Discharge Attending Physician: Shirlene Rodriguez MD    Primary Care Physician: Kyle Acuna MD    Diagnoses:  Active Hospital Problems    Diagnosis  POA    *Pancreatitis, acute [K85.90]  Yes    COPD (chronic obstructive pulmonary disease) [J44.9]  Yes    Calculus of gallbladder with chronic cholecystitis without obstruction [K80.10]  Yes    Upper abdominal pain [R10.10]  Yes    Abnormal CT scan [R93.8]  Yes    Thrombocytosis [D47.3]  Yes    Seizure disorder [G40.909]  Yes    Essential hypertension [I10]  Yes    REX (acute kidney injury) [N17.9]  Yes    Chronic diastolic congestive heart failure [I50.32]  Yes    Atrial fibrillation [I48.91]  Yes      Resolved Hospital Problems    Diagnosis Date Resolved POA   No resolved problems to display.     Discharged Condition: Good    Hospital Course:   Miss Landa presented for evaluation of abdominal pain which had been present for 1-2 weeks. The pain was worse after she eats. The pain was throughout her abdomen. She denied known alleviating factors other than analgesics she had received here. She reported being evaluated at another facility and being told she would need a cholecystectomy. She was evaluated here in the ED and underwent CT abdomen which revealed findings suggestive of "groove pancreatitis"  and dilated CBD, possibly due to pancreaticoduodenal process. She denied alcohol consumption, trauma, new medication. She smokes cigarettes. She denied weight loss. She stated her mother was diagnosed with either biliary or liver cancer. She reported history of PUD. She denied using NSAID recently. She denied fever. Patient was admitted to Hospitalist medicine service. Patient was evaluated by Dr. Marquez and Dr. Alfonso. HIDA scan was abnormal. Patient under went lap chol. A CHRISS is still present. Patient is being sent home with CHRISS drain. CHRISS drain care discussed. Patient " needs to have a EUS to be done. An appointment with Dr. Lubin is made for EUS. Patient is being discharged home in stable condition with following discharge plan of care. Total time with the patient was 30 minutes and greater than 50% was spent in counseling and coordination of care. The assessment and plan have been discussed at length. Physicians' notes reviewed. Labs and procedure reviewed.     Consults: Dr. Marquez and Dr. Alfonso    Significant Diagnostic Studies:      HIDA:   1.  Lack of gallbladder visualization, suggestive of cystic duct obstruction/acute cholecystitis in the appropriate clinical setting.    2. The common bile duct appears dilated but there is bowel activity within one hour. Given recent CT findings, findings are suggestive of a partial distal common bile duct obstruction. Differential considerations include a choledocholithiasis and a partially obstructing mass.     CXR: Cardiomegaly and mild central predominant interstitial pattern suggestive of mild interstitial edema/CHF. This pattern is similar compared to the 11/15/16 study.     CT abdomen:   1.  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  2.  Moderate dilatation of the common bile duct likely related to the pancreaticoduodenal process.  3.  Cholelithiasis.  4.  Left renal lesion probable cyst, however confirmation with renal ultrasound is recommended.     US Kidneys:  1.  2, simple left mid renal cysts are noted measuring up to 2.3 cm.  2. No acute abnormality is seen. There is increased renal cortical echogenicity bilaterally, consistent with chronic renal parenchymal disease.     US abdomen:  Cholelithiasis.  Mildly dilated common bile duct without a point of obstruction seen    Microbiology Results (last 7 days)     ** No results found for the last 168 hours. **        Special Treatments/Procedures: as above  Disposition: Home or Self Care    Medications:  Reconciled Home Medications:   Current  Discharge Medication List      START taking these medications    Details   hydrocodone-acetaminophen 7.5-325mg (NORCO) 7.5-325 mg per tablet Take 1 tablet by mouth every 6 (six) hours as needed for Pain.  Qty: 14 tablet, Refills: 0         CONTINUE these medications which have NOT CHANGED    Details   atorvastatin (LIPITOR) 40 MG tablet Take 40 mg by mouth once daily.   Refills: 0      baclofen (LIORESAL) 10 MG tablet Take 10 mg by mouth 2 (two) times daily.      furosemide (LASIX) 40 MG tablet Take 40 mg by mouth 2 (two) times daily.      ipratropium-albuterol (COMBIVENT)  mcg/actuation inhaler Inhale 1 puff into the lungs every 6 (six) hours as needed for Wheezing. Rescue      lacosamide 150 mg Tab Take 1 tablet (150 mg total) by mouth 2 (two) times daily.  Qty: 60 tablet, Refills: 1      metoprolol succinate (TOPROL-XL) 200 MG 24 hr tablet Take 1 tablet (200 mg total) by mouth once daily.  Qty: 30 tablet, Refills: 1      pantoprazole (PROTONIX) 40 MG tablet Take 40 mg by mouth once daily.      rivaroxaban (XARELTO) 20 mg Tab Take 20 mg by mouth daily with dinner or evening meal.      sucralfate (CARAFATE) 1 gram tablet Take 1 g by mouth 4 (four) times daily.      torsemide (DEMADEX) 20 MG Tab Take 1 tablet (20 mg total) by mouth once daily.      fluoxetine (PROZAC) 20 MG capsule Take 20 mg by mouth once daily.         STOP taking these medications       digoxin (LANOXIN) 250 mcg tablet Comments:   Reason for Stopping:  Patient has not taken since 2016.              Discharge Procedure Orders  Diet general   Order Comments: Cardiac/ 2 gram sodium low cholesterol diet     Other restrictions (specify):   Order Comments: Fall precautions     Call MD for:   Order Comments: For worsening symptoms, chest pain, shortness of breath, increased abdominal pain, high grade fever, stroke or stroke like symptoms, immediately go to the nearest Emergency Room or call 911 as soon as possible.       Follow-up Information      Syed Marquez MD On 6/6/2017.    Specialties:  General Surgery, Surgery  Why:  @1:40pm   Contact information:  [address]  [address]  [place] [address]  [phone]             Gagan Byrd III, MD.    Why:  nurse will call you with an appointment  Contact information:  [address]   [place] [address]  [phone]           Kyle Acuna MD In 1 week.    Specialty:  Internal Medicine  Contact information:  [address]  [address]  [place] [address]  [phone]

## 2017-06-03 VITALS
WEIGHT: 155 LBS | TEMPERATURE: 100 F | BODY MASS INDEX: 30.43 KG/M2 | RESPIRATION RATE: 18 BRPM | DIASTOLIC BLOOD PRESSURE: 70 MMHG | SYSTOLIC BLOOD PRESSURE: 165 MMHG | OXYGEN SATURATION: 95 % | HEIGHT: 60 IN | HEART RATE: 80 BPM

## 2017-06-03 LAB
ALBUMIN SERPL BCP-MCNC: 2.2 G/DL
ALP SERPL-CCNC: 101 U/L
ALT SERPL W/O P-5'-P-CCNC: 11 U/L
ANION GAP SERPL CALC-SCNC: 11 MMOL/L
AST SERPL-CCNC: 13 U/L
BACTERIA #/AREA URNS HPF: NORMAL /HPF
BASOPHILS # BLD AUTO: 0 K/UL
BASOPHILS NFR BLD: 0.1 %
BILIRUB SERPL-MCNC: 0.3 MG/DL
BILIRUB UR QL STRIP: NEGATIVE
BUN SERPL-MCNC: 17 MG/DL
CALCIUM SERPL-MCNC: 8.3 MG/DL
CHLORIDE SERPL-SCNC: 110 MMOL/L
CLARITY UR: CLEAR
CO2 SERPL-SCNC: 20 MMOL/L
COLOR UR: YELLOW
CREAT SERPL-MCNC: 1.5 MG/DL
DIFFERENTIAL METHOD: ABNORMAL
EOSINOPHIL # BLD AUTO: 0 K/UL
EOSINOPHIL NFR BLD: 0.1 %
ERYTHROCYTE [DISTWIDTH] IN BLOOD BY AUTOMATED COUNT: 23.5 %
EST. GFR  (AFRICAN AMERICAN): 41 ML/MIN/1.73 M^2
EST. GFR  (NON AFRICAN AMERICAN): 36 ML/MIN/1.73 M^2
GLUCOSE SERPL-MCNC: 115 MG/DL
GLUCOSE UR QL STRIP: NEGATIVE
HCT VFR BLD AUTO: 28.2 %
HGB BLD-MCNC: 9.1 G/DL
HGB UR QL STRIP: ABNORMAL
HYALINE CASTS #/AREA URNS LPF: 0 /LPF
KETONES UR QL STRIP: NEGATIVE
LEUKOCYTE ESTERASE UR QL STRIP: NEGATIVE
LYMPHOCYTES # BLD AUTO: 1.7 K/UL
LYMPHOCYTES NFR BLD: 8.4 %
MAGNESIUM SERPL-MCNC: 1.5 MG/DL
MCH RBC QN AUTO: 23.1 PG
MCHC RBC AUTO-ENTMCNC: 32.3 %
MCV RBC AUTO: 72 FL
MICROSCOPIC COMMENT: NORMAL
MONOCYTES # BLD AUTO: 1.1 K/UL
MONOCYTES NFR BLD: 5.6 %
NEUTROPHILS # BLD AUTO: 17.1 K/UL
NEUTROPHILS NFR BLD: 85.8 %
NITRITE UR QL STRIP: NEGATIVE
PH UR STRIP: 6 [PH] (ref 5–8)
PHOSPHATE SERPL-MCNC: 3.2 MG/DL
PLATELET # BLD AUTO: 342 K/UL
PMV BLD AUTO: 8.5 FL
POTASSIUM SERPL-SCNC: 3.7 MMOL/L
PROT SERPL-MCNC: 5.8 G/DL
PROT UR QL STRIP: ABNORMAL
RBC # BLD AUTO: 3.94 M/UL
RBC #/AREA URNS HPF: 0 /HPF (ref 0–4)
SODIUM SERPL-SCNC: 141 MMOL/L
SP GR UR STRIP: 1.02 (ref 1–1.03)
SQUAMOUS #/AREA URNS HPF: 6 /HPF
TROPONIN I SERPL DL<=0.01 NG/ML-MCNC: 0.02 NG/ML
URN SPEC COLLECT METH UR: ABNORMAL
UROBILINOGEN UR STRIP-ACNC: 1 EU/DL
WBC # BLD AUTO: 19.9 K/UL
WBC #/AREA URNS HPF: 1 /HPF (ref 0–5)
YEAST URNS QL MICRO: NORMAL

## 2017-06-03 PROCEDURE — 93005 ELECTROCARDIOGRAM TRACING: CPT

## 2017-06-03 PROCEDURE — 25000003 PHARM REV CODE 250: Performed by: PHYSICIAN ASSISTANT

## 2017-06-03 PROCEDURE — 36415 COLL VENOUS BLD VENIPUNCTURE: CPT

## 2017-06-03 PROCEDURE — 25000003 PHARM REV CODE 250: Performed by: EMERGENCY MEDICINE

## 2017-06-03 PROCEDURE — 25000003 PHARM REV CODE 250: Performed by: INTERNAL MEDICINE

## 2017-06-03 PROCEDURE — 85025 COMPLETE CBC W/AUTO DIFF WBC: CPT

## 2017-06-03 PROCEDURE — 80053 COMPREHEN METABOLIC PANEL: CPT

## 2017-06-03 PROCEDURE — 83735 ASSAY OF MAGNESIUM: CPT

## 2017-06-03 PROCEDURE — 84100 ASSAY OF PHOSPHORUS: CPT

## 2017-06-03 PROCEDURE — 27000221 HC OXYGEN, UP TO 24 HOURS

## 2017-06-03 RX ADMIN — OXYCODONE HYDROCHLORIDE 5 MG: 5 TABLET ORAL at 03:06

## 2017-06-03 RX ADMIN — DIGOXIN 250 MCG: 0.12 TABLET ORAL at 09:06

## 2017-06-03 RX ADMIN — NICOTINE 1 PATCH: 21 PATCH, EXTENDED RELEASE TRANSDERMAL at 09:06

## 2017-06-03 RX ADMIN — PANTOPRAZOLE SODIUM 40 MG: 40 TABLET, DELAYED RELEASE ORAL at 09:06

## 2017-06-03 RX ADMIN — METOPROLOL SUCCINATE 200 MG: 50 TABLET, EXTENDED RELEASE ORAL at 09:06

## 2017-06-03 RX ADMIN — SUCRALFATE 1 G: 1 SUSPENSION ORAL at 12:06

## 2017-06-03 RX ADMIN — BACLOFEN 10 MG: 10 TABLET ORAL at 09:06

## 2017-06-03 RX ADMIN — FLUOXETINE 20 MG: 20 CAPSULE ORAL at 09:06

## 2017-06-03 RX ADMIN — HEPARIN SODIUM 5000 UNITS: 5000 INJECTION, SOLUTION INTRAVENOUS; SUBCUTANEOUS at 09:06

## 2017-06-03 RX ADMIN — SODIUM CHLORIDE, SODIUM LACTATE, POTASSIUM CHLORIDE, AND CALCIUM CHLORIDE: .6; .31; .03; .02 INJECTION, SOLUTION INTRAVENOUS at 06:06

## 2017-06-03 RX ADMIN — SUCRALFATE 1 G: 1 SUSPENSION ORAL at 08:06

## 2017-06-03 RX ADMIN — LACOSAMIDE 150 MG: 10 SOLUTION ORAL at 09:06

## 2017-06-03 RX ADMIN — OXYCODONE HYDROCHLORIDE 5 MG: 5 TABLET ORAL at 09:06

## 2017-06-03 NOTE — PROGRESS NOTES
16:26  Pt's son, Mars contacted [facility] to inform that he will transport pt home.  States he will be here by 5:0pm, requests pt to be ready.  Anali provided Mars w/ # to 3rd floor.  ANALI updated pt's nurse, Kae @ [phone].
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
## 2017-06-03 NOTE — TREATMENT PLAN
Attempted to reach daughter in law for transportation. Unable to reach family, left message on VM to call unit. Pt unable to give alternate family member to contact. Will notify [employer]

## 2017-06-03 NOTE — SIGNIFICANT EVENT
"Called to bedside to evaluate already discharged patient who was refusing to leave  The hospital. Attempted to discuss why the patient wanted to stay, and speak with her about her plan of care. Patient became irate and began cursing to me, "If I fall I'm suing you all" and "this is Bullshit". Attempted to initiate PT evaluation on patient's behalf but she insisted to leave, and refused PT services. I also attempted to discuss her post-op care and she refused.   "

## 2017-06-03 NOTE — PLAN OF CARE
"Problem: Patient Care Overview  Goal: Plan of Care Review  Outcome: Ongoing (interventions implemented as appropriate)  Calls out "OH God" or "jeannine King" for a extended length of time. Have given pain pill in hopes that it is the pain causing her to call out. After approx. 30/45 min after pill she does become quite. Ms. hCou is compliant taking her meds, and will ans questions appropriately.Antibiotic therapy in progress. No pressure ulcer development to dateNo falls or trauma this shift.Pt. Verbalizes understanding of their plan of care.      "

## 2017-06-03 NOTE — NURSING
Dr. Allen notified regarding patients temp 99.8. Ok to D/C. Patient's son will be here at 17:00 to  patient. Will continue to monitor.

## 2017-06-03 NOTE — PLAN OF CARE
0900 Pt transferred to third floor 302-2.  Primary nurse receiving report during transfer.  A/o settled in room.  Some agitation noted.      2990-2448: pts agitation escalated.  Pt jeanne gwendolyn stating she could get her pain meds on time when she was up on fourth floor and that she was able to get her regular meds on time but its 1100 and im just now getting them.  Pt did not receive AM meds prior to transfer d/t refusal.  Attempted to calm patient and explain the transfer process and that she will receive her scheduled and PRN medication.  IV morphine administered and patient quickly apologized and calmed.  Melissa notified of pts agitation and requested to speak with patient.  Charge nurse updated.

## 2017-06-03 NOTE — PROGRESS NOTES
BRITNEY notified by pt's RN that pt does not have transport home, for SW to contact pt's FARIDA Graham @ [phone].      12:05  BRITNEY met w/ pt re: family/friends available to transport home and to confirm pt current w/ HH.  Pt states has her FARIDA Graham only.  Pt states she is current w/ [facility], requests to resume.      12:11  BRITNEY called Gladys [phone] & [phone] no answer, left VM to return SW call.  Attempted to contact FARIDA Torres [phone], no answer unable to leave VM.    Attempted to contact family @ pt's home phone # [phone], left VM.

## 2017-06-03 NOTE — PROGRESS NOTES
BRITNEY contacted Feli jimenez/[facility]  [phone] re: resumption of care.  BRITNEY instructed to fax packet to [facility] @ [phone].  BRITNEY faxed face sheet, orders, H&P, AVS.

## 2017-06-04 NOTE — PLAN OF CARE
06/04/17 0851   Final Note   Assessment Type Final Discharge Note   Discharge Disposition Home-Health  ([facility])   Discharge planning education complete? Yes

## 2017-06-04 NOTE — NURSING
Discharge instructions given to patient. Verbalized understanding. Peripheral IV removed. Tolerated well. CHRISS drain remained intact. Patient will have home healthcare. Patient left floor via wheelchair. Mars(son) picked up patient. Patient remained free from fall and injury.

## 2017-06-06 ENCOUNTER — TELEPHONE (OUTPATIENT)
Dept: SURGERY | Facility: CLINIC | Age: 67
End: 2017-06-06

## 2017-06-07 ENCOUNTER — TELEPHONE (OUTPATIENT)
Dept: SURGERY | Facility: CLINIC | Age: 67
End: 2017-06-07

## 2017-06-07 NOTE — TELEPHONE ENCOUNTER
----- Message from Navneet Beltran sent at 6/7/2017  2:51 PM CDT -----  Contact: self   [phone]   Patient called asking to speak with the nurse,patient did not have transportation for office visit today. Patient want to know if it is okay to reschedule to see the doctor Friday. Thanks!

## 2017-06-08 LAB
BACTERIA BLD CULT: NORMAL
BACTERIA BLD CULT: NORMAL

## 2017-06-09 ENCOUNTER — OFFICE VISIT (OUTPATIENT)
Dept: SURGERY | Facility: CLINIC | Age: 67
End: 2017-06-09
Payer: MEDICARE

## 2017-06-09 VITALS — TEMPERATURE: 99 F

## 2017-06-09 DIAGNOSIS — Z98.890 POST-OPERATIVE STATE: Primary | ICD-10-CM

## 2017-06-09 PROCEDURE — 99024 POSTOP FOLLOW-UP VISIT: CPT | Mod: ,,, | Performed by: SURGERY

## 2017-06-09 PROCEDURE — 99999 PR PBB SHADOW E&M-EST. PATIENT-LVL II: CPT | Mod: PBBFAC,,, | Performed by: SURGERY

## 2017-06-09 PROCEDURE — 99212 OFFICE O/P EST SF 10 MIN: CPT | Mod: PBBFAC,PO | Performed by: SURGERY

## 2017-06-09 RX ORDER — ATORVASTATIN CALCIUM 40 MG/1
TABLET, FILM COATED ORAL
COMMUNITY

## 2017-06-09 RX ORDER — AMLODIPINE BESYLATE 10 MG/1
TABLET ORAL
COMMUNITY

## 2017-06-09 RX ORDER — METOPROLOL SUCCINATE 25 MG/1
TABLET, EXTENDED RELEASE ORAL
COMMUNITY
End: 2017-06-09

## 2017-06-09 RX ORDER — ASPIRIN 81 MG/1
TABLET ORAL
COMMUNITY

## 2017-06-09 RX ORDER — AMITRIPTYLINE HYDROCHLORIDE 25 MG/1
TABLET, FILM COATED ORAL
COMMUNITY

## 2017-06-09 RX ORDER — FLUOXETINE HYDROCHLORIDE 40 MG/1
CAPSULE ORAL
COMMUNITY
End: 2017-06-09 | Stop reason: SDUPTHER

## 2017-11-01 ENCOUNTER — HISTORICAL (OUTPATIENT)
Dept: ADMINISTRATIVE | Facility: HOSPITAL | Age: 67
End: 2017-11-01

## 2018-03-05 ENCOUNTER — HISTORICAL (OUTPATIENT)
Dept: ADMINISTRATIVE | Facility: HOSPITAL | Age: 68
End: 2018-03-05

## 2018-03-08 LAB
ALBUMIN SERPL-MCNC: 1.7 G/DL (ref 3.1–4.7)
ALP SERPL-CCNC: 46 IU/L (ref 40–104)
ALT (SGPT): 15 IU/L (ref 3–33)
AST SERPL-CCNC: 21 IU/L (ref 10–40)
BILIRUB SERPL-MCNC: 1.6 MG/DL (ref 0.3–1)
BUN SERPL-MCNC: 93 MG/DL (ref 8–20)
CALCIUM SERPL-MCNC: 8 MG/DL (ref 7.7–10.4)
CHLORIDE: 103 MMOL/L (ref 98–110)
CO2 SERPL-SCNC: 25.7 MMOL/L (ref 22.8–31.6)
CREATININE: 2.71 MG/DL (ref 0.6–1.4)
GLUCOSE: 107 MG/DL (ref 70–99)
PHOSPHATE FLD-MCNC: 4.2 MG/DL (ref 2.5–4.9)
POTASSIUM SERPL-SCNC: 3.3 MMOL/L (ref 3.5–5)
PROT SERPL-MCNC: 4.9 G/DL (ref 6–8.2)
SODIUM: 140 MMOL/L (ref 134–144)

## 2018-03-09 LAB
ALBUMIN SERPL-MCNC: 1.7 G/DL (ref 3.1–4.7)
ALP SERPL-CCNC: 43 IU/L (ref 40–104)
ALT (SGPT): 15 IU/L (ref 3–33)
AST SERPL-CCNC: 21 IU/L (ref 10–40)
BILIRUB SERPL-MCNC: 1.2 MG/DL (ref 0.3–1)
BNP SERPL-MCNC: 308 PG/ML (ref 0–100)
BUN SERPL-MCNC: 104 MG/DL (ref 8–20)
CALCIUM SERPL-MCNC: 7.6 MG/DL (ref 7.7–10.4)
CHLORIDE: 97 MMOL/L (ref 98–110)
CO2 SERPL-SCNC: 24.3 MMOL/L (ref 22.8–31.6)
CREATININE: 3.31 MG/DL (ref 0.6–1.4)
GLUCOSE: 164 MG/DL (ref 70–99)
HCT VFR BLD AUTO: 25.1 % (ref 36–48)
HGB BLD-MCNC: 8.1 G/DL (ref 12–15)
MCH RBC QN AUTO: 21.8 PG (ref 25–35)
MCHC RBC AUTO-ENTMCNC: 32.3 G/DL (ref 31–36)
MCV RBC AUTO: 67.5 FL (ref 79–98)
NUCLEATED RBCS: 0 %
PHOSPHATE FLD-MCNC: 5.4 MG/DL (ref 2.5–4.9)
PLATELET # BLD AUTO: 197 K/UL (ref 140–440)
POTASSIUM SERPL-SCNC: 4.1 MMOL/L (ref 3.5–5)
PROT SERPL-MCNC: 4.8 G/DL (ref 6–8.2)
RBC # BLD AUTO: 3.72 M/UL (ref 3.5–5.5)
SODIUM: 135 MMOL/L (ref 134–144)
WBC # BLD AUTO: 34.3 K/UL (ref 5–10)

## 2018-03-10 LAB
ALBUMIN SERPL-MCNC: 1.3 G/DL (ref 3.1–4.7)
ALP SERPL-CCNC: 45 IU/L (ref 40–104)
ALT (SGPT): 13 IU/L (ref 3–33)
AST SERPL-CCNC: 24 IU/L (ref 10–40)
BILIRUB SERPL-MCNC: 0.8 MG/DL (ref 0.3–1)
BNP SERPL-MCNC: 337 PG/ML (ref 0–100)
BUN SERPL-MCNC: 108 MG/DL (ref 8–20)
CALCIUM SERPL-MCNC: 7.4 MG/DL (ref 7.7–10.4)
CHLORIDE: 101 MMOL/L (ref 98–110)
CO2 SERPL-SCNC: 21.6 MMOL/L (ref 22.8–31.6)
CREATININE: 3.61 MG/DL (ref 0.6–1.4)
GLUCOSE: 147 MG/DL (ref 70–99)
HCT VFR BLD AUTO: 18.2 % (ref 36–48)
HGB BLD-MCNC: 5.8 G/DL (ref 12–15)
MCH RBC QN AUTO: 21.7 PG (ref 25–35)
MCHC RBC AUTO-ENTMCNC: 31.9 G/DL (ref 31–36)
MCV RBC AUTO: 68.2 FL (ref 79–98)
NUCLEATED RBCS: 0 %
PHOSPHATE FLD-MCNC: 6.9 MG/DL (ref 2.5–4.9)
PLATELET # BLD AUTO: 204 K/UL (ref 140–440)
POTASSIUM SERPL-SCNC: 3.8 MMOL/L (ref 3.5–5)
PROT SERPL-MCNC: 4 G/DL (ref 6–8.2)
RBC # BLD AUTO: 2.67 M/UL (ref 3.5–5.5)
SODIUM: 137 MMOL/L (ref 134–144)
WBC # BLD AUTO: 48 K/UL (ref 5–10)

## 2018-03-11 LAB
ALP SERPL-CCNC: 103 IU/L (ref 40–104)
BNP SERPL-MCNC: 198 PG/ML (ref 0–100)
CALCIUM SERPL-MCNC: 9 MG/DL (ref 7.7–10.4)
GLUCOSE: 119 MG/DL (ref 70–99)
HCT VFR BLD AUTO: 25.4 % (ref 36–48)
HGB BLD-MCNC: 8.4 G/DL (ref 12–15)
MCH RBC QN AUTO: 27.5 PG (ref 25–35)
MCHC RBC AUTO-ENTMCNC: 33.1 G/DL (ref 31–36)
MCV RBC AUTO: 83 FL (ref 79–98)
NUCLEATED RBCS: 2 %
PLATELET # BLD AUTO: 125 K/UL (ref 140–440)
PROT SERPL-MCNC: 3.5 G/DL (ref 6–8.2)
RBC # BLD AUTO: 3.06 M/UL (ref 3.5–5.5)
WBC # BLD AUTO: 64.5 K/UL (ref 5–10)

## 2018-03-12 ENCOUNTER — TELEPHONE (OUTPATIENT)
Dept: PULMONOLOGY | Facility: CLINIC | Age: 68
End: 2018-03-12

## 2018-03-12 NOTE — TELEPHONE ENCOUNTER
Verifying that Dr Vernon pronounced pt over the weekend.     ----- Message from Diana Cruz sent at 3/12/2018  9:51 AM CDT -----  Stephanie with [facility] 's office requesting to speak with nurse or doctor/please call back at [phone].

## (undated) DEVICE — CLOSURE SKIN STERI STRIP 1/2X4

## (undated) DEVICE — SCISSOR CURVED ENDOPATH 5MM

## (undated) DEVICE — SOL WATER STRL IRR 1000ML

## (undated) DEVICE — ELECTRODE REM PLYHSV RETURN 9

## (undated) DEVICE — APPLIER CLIP ENDO LIGAMAX 5MM

## (undated) DEVICE — TROCAR 5X100MM BLADED Z THREAD

## (undated) DEVICE — SUT MONOCRYL 4-0 SH UND MON

## (undated) DEVICE — SPONGE IV DRAIN 4X4 STERILE

## (undated) DEVICE — EVACUATOR WOUND BULB 100CC

## (undated) DEVICE — SUT 0 VICRYL / UR6 (J603)

## (undated) DEVICE — PACK CUSTOM ENDO CHOLO SLI

## (undated) DEVICE — SLEEVE SCD EXPRESS CALF MEDIUM

## (undated) DEVICE — SUT ETHILON 2-0 FS 18IN BLK

## (undated) DEVICE — LINER SUCTION 3000CC

## (undated) DEVICE — GLOVE SURG ULTRA TOUCH 8

## (undated) DEVICE — SOL IRR NACL .9% 3000ML

## (undated) DEVICE — BAG TISS RETRV MONARCH 10MM

## (undated) DEVICE — IRRIGATOR SUCTION W/TIP

## (undated) DEVICE — SWABSTICK BENZOIN 4 IN

## (undated) DEVICE — APPLICATOR CHLORAPREP ORN 26ML

## (undated) DEVICE — UNDERGLOVES BIOGEL PI SIZE 8

## (undated) DEVICE — CANNULA LAP SEAL Z THRD 5X100

## (undated) DEVICE — TROCAR SURG BLD NONTHRD 11X100

## (undated) DEVICE — KIT ANTIFOG

## (undated) DEVICE — SYR 10CC LUER LOCK

## (undated) DEVICE — SUT VICRYL 4-0 RB1 27IN UD

## (undated) DEVICE — DRAIN WOUND 19FR TROCAR

## (undated) DEVICE — TUBING PNEUMO

## (undated) DEVICE — SEE MEDLINE ITEM 157117

## (undated) DEVICE — NDL PNEUMO INSUFFLATI 120MM